# Patient Record
Sex: FEMALE | URBAN - NONMETROPOLITAN AREA
[De-identification: names, ages, dates, MRNs, and addresses within clinical notes are randomized per-mention and may not be internally consistent; named-entity substitution may affect disease eponyms.]

---

## 2021-02-18 ENCOUNTER — HISTORICAL (OUTPATIENT)
Dept: ADMINISTRATIVE | Facility: HOSPITAL | Age: 86
End: 2021-02-18

## 2021-02-19 ENCOUNTER — HISTORICAL (OUTPATIENT)
Dept: ADMINISTRATIVE | Facility: HOSPITAL | Age: 86
End: 2021-02-19

## 2021-02-21 ENCOUNTER — HISTORICAL (OUTPATIENT)
Dept: ADMINISTRATIVE | Facility: HOSPITAL | Age: 86
End: 2021-02-21

## 2021-02-22 ENCOUNTER — HISTORICAL (OUTPATIENT)
Dept: ADMINISTRATIVE | Facility: HOSPITAL | Age: 86
End: 2021-02-22

## 2021-02-25 ENCOUNTER — HISTORICAL (OUTPATIENT)
Dept: ADMINISTRATIVE | Facility: HOSPITAL | Age: 86
End: 2021-02-25

## 2021-03-02 ENCOUNTER — HISTORICAL (OUTPATIENT)
Dept: ADMINISTRATIVE | Facility: HOSPITAL | Age: 86
End: 2021-03-02

## 2021-03-26 ENCOUNTER — HISTORICAL (OUTPATIENT)
Dept: ADMINISTRATIVE | Facility: HOSPITAL | Age: 86
End: 2021-03-26

## 2025-04-10 ENCOUNTER — HOSPITAL ENCOUNTER (EMERGENCY)
Facility: HOSPITAL | Age: OVER 89
Discharge: HOME | End: 2025-04-10
Attending: STUDENT IN AN ORGANIZED HEALTH CARE EDUCATION/TRAINING PROGRAM
Payer: MEDICARE

## 2025-04-10 VITALS
HEART RATE: 66 BPM | OXYGEN SATURATION: 97 % | HEIGHT: 60 IN | DIASTOLIC BLOOD PRESSURE: 74 MMHG | TEMPERATURE: 98 F | WEIGHT: 142.69 LBS | RESPIRATION RATE: 18 BRPM | BODY MASS INDEX: 28.01 KG/M2 | SYSTOLIC BLOOD PRESSURE: 146 MMHG

## 2025-04-10 DIAGNOSIS — S01.81XA LACERATION OF FOREHEAD, INITIAL ENCOUNTER: Primary | ICD-10-CM
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
## 2025-04-10 PROCEDURE — 63600175 PHARM REV CODE 636 W HCPCS: Performed by: STUDENT IN AN ORGANIZED HEALTH CARE EDUCATION/TRAINING PROGRAM

## 2025-04-10 PROCEDURE — 99285 EMERGENCY DEPT VISIT HI MDM: CPT | Mod: 25

## 2025-04-10 PROCEDURE — 90715 TDAP VACCINE 7 YRS/> IM: CPT | Performed by: STUDENT IN AN ORGANIZED HEALTH CARE EDUCATION/TRAINING PROGRAM

## 2025-04-10 PROCEDURE — 12013 RPR F/E/E/N/L/M 2.6-5.0 CM: CPT

## 2025-04-10 PROCEDURE — 90471 IMMUNIZATION ADMIN: CPT | Performed by: STUDENT IN AN ORGANIZED HEALTH CARE EDUCATION/TRAINING PROGRAM

## 2025-04-10 PROCEDURE — 25000003 PHARM REV CODE 250

## 2025-04-10 RX ORDER — MUPIROCIN 20 MG/G
1 OINTMENT TOPICAL
Status: COMPLETED | OUTPATIENT
Start: 2025-04-10 | End: 2025-04-10

## 2025-04-10 RX ORDER — LIDOCAINE HYDROCHLORIDE 10 MG/ML
10 INJECTION, SOLUTION INFILTRATION; PERINEURAL
Status: COMPLETED | OUTPATIENT
Start: 2025-04-10 | End: 2025-04-10

## 2025-04-10 RX ADMIN — CLOSTRIDIUM TETANI TOXOID ANTIGEN (FORMALDEHYDE INACTIVATED), CORYNEBACTERIUM DIPHTHERIAE TOXOID ANTIGEN (FORMALDEHYDE INACTIVATED), BORDETELLA PERTUSSIS TOXOID ANTIGEN (GLUTARALDEHYDE INACTIVATED), BORDETELLA PERTUSSIS FILAMENTOUS HEMAGGLUTININ ANTIGEN (FORMALDEHYDE INACTIVATED), BORDETELLA PERTUSSIS PERTACTIN ANTIGEN, AND BORDETELLA PERTUSSIS FIMBRIAE 2/3 ANTIGEN 0.5 ML: 5; 2; 2.5; 5; 3; 5 INJECTION, SUSPENSION INTRAMUSCULAR at 05:04

## 2025-04-10 RX ADMIN — MUPIROCIN 1 TUBE: 20 OINTMENT TOPICAL at 05:04

## 2025-04-10 RX ADMIN — LIDOCAINE HYDROCHLORIDE 10 ML: 10 INJECTION, SOLUTION INFILTRATION; PERINEURAL at 05:04

## 2025-04-10 NOTE — ED PROVIDER NOTES
Encounter Date: 4/10/2025       History     Chief Complaint   Patient presents with    Fall    Head Laceration     PRESENTS TO ED PER [facility] FROM HOME WITH C/O FALL WITH HEAD INJURY, ON XARELTO FOR HX A-FIB. LACERATION VIA RT. EYEBROW AND LT. LATERAL HAND. BLEEDING CONTROLLED. NO LOSS OF LOC. A0X4. DENIES PAIN     HPI  Patient is a 89-year-old female past medical history of hypertension, depression, atrial fibrillation, on Eliquis, who presents to ER status post mechanical fall at home.  Patient's struck her head, causing laceration to the lateral right eyebrow.  She denies any LOC, preceding chest pain, shortness of breath.  Patient denies any pain at this time.  Review of patient's allergies indicates:  No Known Allergies  Past Medical History:   Diagnosis Date    A-fib     Depression     Hypertension     Hypothyroidism, unspecified      History reviewed. No pertinent surgical history.  No family history on file.  Social History[1]  Review of Systems   Constitutional:  Negative for fever.   HENT:  Negative for sore throat.    Respiratory:  Negative for shortness of breath.    Cardiovascular:  Negative for chest pain.   Gastrointestinal:  Negative for nausea.   Genitourinary:  Negative for dysuria.   Musculoskeletal:  Negative for back pain.   Skin:  Positive for wound. Negative for rash.   Neurological:  Negative for weakness.   Hematological:  Does not bruise/bleed easily.   All other systems reviewed and are negative.      Physical Exam     Initial Vitals [04/10/25 1702]   BP Pulse Resp Temp SpO2   (!) 146/74 71 18 97.9 °F (36.6 °C) 97 %      MAP       --         Physical Exam    Nursing note and vitals reviewed.  Constitutional: Vital signs are normal. She appears well-developed and well-nourished. She is not diaphoretic. She is active.  Non-toxic appearance. She does not appear ill. No distress.   HENT:   Head: Normocephalic.   Eyes: Conjunctivae are normal. Pupils are equal, round, and reactive to light.  Right conjunctiva is not injected. Left conjunctiva is not injected.   Neck: Trachea normal. Neck supple.   Normal range of motion.   Full passive range of motion without pain.     Cardiovascular:  Normal rate, regular rhythm, S1 normal, S2 normal, intact distal pulses and normal pulses.           Abdominal: Abdomen is soft. Bowel sounds are normal. There is no abdominal tenderness.   Musculoskeletal:      Cervical back: Full passive range of motion without pain, normal range of motion and neck supple. No rigidity.      Right lower leg: No swelling. No edema.      Left lower leg: No swelling. No edema.     Neurological: She is alert and oriented to person, place, and time.   Skin: Skin is warm and dry. Capillary refill takes less than 2 seconds.   3 cm laceration with hematoma noted to right lateral eyebrow.  Hemostatic.  No foreign body noted.         ED Course   Lac Repair    Date/Time: 4/10/2025 4:58 PM    Performed by: Jerome Meng MD  Authorized by: Jerome Meng MD    Consent:     Consent obtained:  Verbal    Consent given by:  Patient    Risks discussed:  Infection, poor cosmetic result and poor wound healing  Laceration details:     Location:  Face    Face location:  R eyebrow  Treatment:     Area cleansed with:  Povidone-iodine and saline  Skin repair:     Repair method:  Sutures    Suture size:  5-0    Wound skin closure material used: ethilon.    Suture technique:  Simple interrupted    Number of sutures:  3  Approximation:     Approximation:  Close  Repair type:     Repair type:  Simple  Post-procedure details:     Dressing:  Antibiotic ointment (4x4 gauze)    Procedure completion:  Tolerated well, no immediate complications    Labs Reviewed - No data to display       Imaging Results              CT Head Without Contrast (Final result)  Result time 04/10/25 18:04:04      Final result by Edgardo Magana MD (04/10/25 18:04:04)                   Impression:        1.  Chronic findings of atrophy and  ischemic disease.  No acute intracranial abnormality.    n/a    Category: n/a    The following dose reduction techniques are used for all CT at [facility]:    1.   Automated exposure control.    2.   Adjustment of the mA and/or kV according to patient size.    3.   Use of iterative reconstruction technique.      Electronically signed by: Edgardo Magana  Date:    04/10/2025  Time:    18:04               Narrative:    EXAMINATION:  CT HEAD WITHOUT CONTRAST    CLINICAL HISTORY:  s/p fall on eliquis;    TECHNIQUE:  Low dose axial images were obtained through the head.  Coronal and sagittal reformations were also performed. Contrast was not administered.    COMPARISON:  03/26/2021    FINDINGS:  Multiplanar imaging through the head/brain was performed.Mild generalized atrophy is present along with mild chronic ischemic disease involving the periventricular deep white matter on both sides.  I see no intraparynchymal masses, hemorhagic lesions, or dominant wedge shaped infarcts. I see no extraxial masses or abnormal fluid collections.                                       X-Ray Tibia Fibula 2 View Right (In process)                      Medications   Tdap vaccine injection 0.5 mL (0.5 mLs Intramuscular Given 4/10/25 1736)   LIDOcaine HCL 10 mg/ml (1%) injection 10 mL (10 mLs Intradermal Given by Provider 4/10/25 1736)   mupirocin 2 % ointment 1 Tube (1 Tube Topical (Top) Given 4/10/25 1730)     Medical Decision Making  Patient is a very pleasant 89-year-old female past medical history of hypertension, depression, atrial fibrillation, on Eliquis, who presents to ER status post mechanical fall at home.      Differential diagnosis includes but not limited to:  Intracranial hemorrhage, skull fracture, orbital fracture, laceration     Secondary to patient being on Eliquis, CT head obtained, negative for any acute intracranial abnormality.  Patient's laceration was repaired at the bedside.  Tolerated  without difficulty.  Patient to be going home with the daughter who was at the bedside.  Recommended follow up with her PCP in next 7-10 days for wound reassessment, suture removal.  Patient otherwise stable for discharge.  Tdap updated here in the ED. patient is not in any pain, declined any pain medications at this time.  Will discharge home with the patient's daughter.  Strict return precautions given.    Jerome Meng M.D.  Emergency Medicine        Amount and/or Complexity of Data Reviewed  Radiology: ordered.    Risk  Prescription drug management.                                      Clinical Impression:  Final diagnoses:  [W19.XXXA] Fall  [S01.81XA] Laceration of forehead, initial encounter (Primary)          ED Disposition Condition    Discharge Stable          ED Prescriptions    None       Follow-up Information       Follow up With Specialties Details Why Contact Info    Your Primary Care Provider  Schedule an appointment as soon as possible for a visit in 7 days For follow up, For suture removal, For wound re-check     [facility]  If symptoms worsen [address] [facility] [id number] [phone]               [1]   Social History  Tobacco Use    Smoking status: Never    Smokeless tobacco: Never   Substance Use Topics    Drug use: Never        eJrome Meng MD  04/10/25 1451

## 2025-04-15 ENCOUNTER — LAB REQUISITION (OUTPATIENT)
Dept: LAB | Facility: HOSPITAL | Age: OVER 89
End: 2025-04-15
Payer: MEDICARE

## 2025-04-15 DIAGNOSIS — I95.9 HYPOTENSION, UNSPECIFIED: ICD-10-CM

## 2025-04-15 DIAGNOSIS — R53.83 OTHER FATIGUE: ICD-10-CM

## 2025-04-15 LAB
ANION GAP SERPL CALC-SCNC: 8 MEQ/L (ref 2–13)
BASOPHILS # BLD AUTO: 0.06 X10(3)/MCL (ref 0.01–0.08)
BASOPHILS NFR BLD AUTO: 0.5 % (ref 0.1–1.2)
BUN SERPL-MCNC: 25 MG/DL (ref 7–20)
CALCIUM SERPL-MCNC: 9.6 MG/DL (ref 8.4–10.2)
CHLORIDE SERPL-SCNC: 104 MMOL/L (ref 98–110)
CO2 SERPL-SCNC: 25 MMOL/L (ref 21–32)
CREAT SERPL-MCNC: 1.56 MG/DL (ref 0.66–1.25)
CREAT/UREA NIT SERPL: 16 (ref 12–20)
EOSINOPHIL # BLD AUTO: 0.07 X10(3)/MCL (ref 0.04–0.36)
EOSINOPHIL NFR BLD AUTO: 0.6 % (ref 0.7–7)
ERYTHROCYTE [DISTWIDTH] IN BLOOD BY AUTOMATED COUNT: 14.6 % (ref 11–14.5)
GFR SERPLBLD CREATININE-BSD FMLA CKD-EPI: 30 ML/MIN/1.73/M2
GLUCOSE SERPL-MCNC: 87 MG/DL (ref 70–115)
HCT VFR BLD AUTO: 30.6 % (ref 36–48)
HGB BLD-MCNC: 10.2 G/DL (ref 11.8–16)
IMM GRANULOCYTES # BLD AUTO: 0.05 X10(3)/MCL (ref 0–0.03)
IMM GRANULOCYTES NFR BLD AUTO: 0.4 % (ref 0–0.5)
LYMPHOCYTES # BLD AUTO: 2.54 X10(3)/MCL (ref 1.16–3.74)
LYMPHOCYTES NFR BLD AUTO: 21.6 % (ref 20–55)
MCH RBC QN AUTO: 30.2 PG (ref 27–34)
MCHC RBC AUTO-ENTMCNC: 33.3 G/DL (ref 31–37)
MCV RBC AUTO: 90.5 FL (ref 79–99)
MONOCYTES # BLD AUTO: 1 X10(3)/MCL (ref 0.24–0.36)
MONOCYTES NFR BLD AUTO: 8.5 % (ref 4.7–12.5)
NEUTROPHILS # BLD AUTO: 8.06 X10(3)/MCL (ref 1.56–6.13)
NEUTROPHILS NFR BLD AUTO: 68.4 % (ref 37–73)
NRBC BLD AUTO-RTO: 0 %
PLATELET # BLD AUTO: 285 X10(3)/MCL (ref 140–371)
PMV BLD AUTO: 9.7 FL (ref 9.4–12.4)
POTASSIUM SERPL-SCNC: 5.1 MMOL/L (ref 3.5–5.1)
RBC # BLD AUTO: 3.38 X10(6)/MCL (ref 4–5.1)
SODIUM SERPL-SCNC: 137 MMOL/L (ref 136–145)
WBC # BLD AUTO: 11.78 X10(3)/MCL (ref 4–11.5)

## 2025-04-15 PROCEDURE — 85025 COMPLETE CBC W/AUTO DIFF WBC: CPT | Performed by: INTERNAL MEDICINE

## 2025-04-15 PROCEDURE — 80048 BASIC METABOLIC PNL TOTAL CA: CPT | Performed by: INTERNAL MEDICINE

## 2025-04-16 ENCOUNTER — HOSPITAL ENCOUNTER (EMERGENCY)
Facility: HOSPITAL | Age: OVER 89
Discharge: HOME | End: 2025-04-16
Attending: FAMILY MEDICINE
Payer: MEDICARE

## 2025-04-16 VITALS
SYSTOLIC BLOOD PRESSURE: 154 MMHG | RESPIRATION RATE: 19 BRPM | OXYGEN SATURATION: 97 % | HEART RATE: 76 BPM | DIASTOLIC BLOOD PRESSURE: 79 MMHG | TEMPERATURE: 98 F | HEIGHT: 60 IN | WEIGHT: 142.63 LBS | BODY MASS INDEX: 28 KG/M2

## 2025-04-16 DIAGNOSIS — R42 DIZZINESS: Primary | ICD-10-CM
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
## 2025-04-16 LAB
ALBUMIN SERPL-MCNC: 4.6 G/DL (ref 3.4–5)
ALBUMIN/GLOB SERPL: 1.4 RATIO
ALP SERPL-CCNC: 124 UNIT/L (ref 50–144)
ALT SERPL-CCNC: 25 UNIT/L (ref 1–45)
ANION GAP SERPL CALC-SCNC: 6 MEQ/L (ref 2–13)
AST SERPL-CCNC: 44 UNIT/L (ref 14–36)
BACTERIA #/AREA URNS AUTO: NORMAL /HPF
BASOPHILS # BLD AUTO: 0.08 X10(3)/MCL (ref 0.01–0.08)
BASOPHILS NFR BLD AUTO: 0.6 % (ref 0.1–1.2)
BILIRUB SERPL-MCNC: 0.6 MG/DL (ref 0–1)
BILIRUB UR QL STRIP.AUTO: NEGATIVE
BUN SERPL-MCNC: 24 MG/DL (ref 7–20)
CALCIUM SERPL-MCNC: 9.7 MG/DL (ref 8.4–10.2)
CHLORIDE SERPL-SCNC: 104 MMOL/L (ref 98–110)
CLARITY UR: CLEAR
CO2 SERPL-SCNC: 28 MMOL/L (ref 21–32)
COLOR UR AUTO: YELLOW
CREAT SERPL-MCNC: 1.52 MG/DL (ref 0.66–1.25)
CREAT/UREA NIT SERPL: 16 (ref 12–20)
EOSINOPHIL # BLD AUTO: 0.06 X10(3)/MCL (ref 0.04–0.36)
EOSINOPHIL NFR BLD AUTO: 0.4 % (ref 0.7–7)
ERYTHROCYTE [DISTWIDTH] IN BLOOD BY AUTOMATED COUNT: 14.6 % (ref 11–14.5)
GFR SERPLBLD CREATININE-BSD FMLA CKD-EPI: 31 ML/MIN/1.73/M2
GLOBULIN SER-MCNC: 3.4 GM/DL (ref 2–3.9)
GLUCOSE SERPL-MCNC: 120 MG/DL (ref 70–115)
GLUCOSE UR QL STRIP: NEGATIVE
HCT VFR BLD AUTO: 34.8 % (ref 36–48)
HGB BLD-MCNC: 11.1 G/DL (ref 11.8–16)
HGB UR QL STRIP: ABNORMAL
IMM GRANULOCYTES # BLD AUTO: 0.07 X10(3)/MCL (ref 0–0.03)
IMM GRANULOCYTES NFR BLD AUTO: 0.5 % (ref 0–0.5)
KETONES UR QL STRIP: NEGATIVE
LEUKOCYTE ESTERASE UR QL STRIP: NEGATIVE
LYMPHOCYTES # BLD AUTO: 3.01 X10(3)/MCL (ref 1.16–3.74)
LYMPHOCYTES NFR BLD AUTO: 21 % (ref 20–55)
MAGNESIUM SERPL-MCNC: 2.1 MG/DL (ref 1.8–2.4)
MCH RBC QN AUTO: 29.4 PG (ref 27–34)
MCHC RBC AUTO-ENTMCNC: 31.9 G/DL (ref 31–37)
MCV RBC AUTO: 92.3 FL (ref 79–99)
MONOCYTES # BLD AUTO: 1.21 X10(3)/MCL (ref 0.24–0.36)
MONOCYTES NFR BLD AUTO: 8.5 % (ref 4.7–12.5)
NEUTROPHILS # BLD AUTO: 9.87 X10(3)/MCL (ref 1.56–6.13)
NEUTROPHILS NFR BLD AUTO: 69 % (ref 37–73)
NITRITE UR QL STRIP: NEGATIVE
NRBC BLD AUTO-RTO: 0 %
PH UR STRIP: 6 [PH]
PLATELET # BLD AUTO: 323 X10(3)/MCL (ref 140–371)
PMV BLD AUTO: 8.7 FL (ref 9.4–12.4)
POTASSIUM SERPL-SCNC: 4.5 MMOL/L (ref 3.5–5.1)
PROT SERPL-MCNC: 8 GM/DL (ref 6.3–8.2)
PROT UR QL STRIP: NEGATIVE
RBC # BLD AUTO: 3.77 X10(6)/MCL (ref 4–5.1)
RBC #/AREA URNS AUTO: NORMAL /HPF
SODIUM SERPL-SCNC: 138 MMOL/L (ref 136–145)
SP GR UR STRIP.AUTO: <=1.005 (ref 1–1.03)
SQUAMOUS #/AREA URNS AUTO: NORMAL /HPF
TROPONIN I SERPL-MCNC: <0.012 NG/ML (ref 0–0.03)
UROBILINOGEN UR STRIP-ACNC: 0.2
WBC # BLD AUTO: 14.3 X10(3)/MCL (ref 4–11.5)
WBC #/AREA URNS AUTO: NORMAL /HPF

## 2025-04-16 PROCEDURE — 83735 ASSAY OF MAGNESIUM: CPT | Performed by: FAMILY MEDICINE

## 2025-04-16 PROCEDURE — 81015 MICROSCOPIC EXAM OF URINE: CPT | Performed by: FAMILY MEDICINE

## 2025-04-16 PROCEDURE — 84484 ASSAY OF TROPONIN QUANT: CPT | Performed by: FAMILY MEDICINE

## 2025-04-16 PROCEDURE — 85025 COMPLETE CBC W/AUTO DIFF WBC: CPT | Performed by: FAMILY MEDICINE

## 2025-04-16 PROCEDURE — 93010 ELECTROCARDIOGRAM REPORT: CPT | Mod: ,,, | Performed by: INTERNAL MEDICINE

## 2025-04-16 PROCEDURE — 93005 ELECTROCARDIOGRAM TRACING: CPT

## 2025-04-16 PROCEDURE — 81003 URINALYSIS AUTO W/O SCOPE: CPT | Performed by: FAMILY MEDICINE

## 2025-04-16 PROCEDURE — 99285 EMERGENCY DEPT VISIT HI MDM: CPT | Mod: 25

## 2025-04-16 PROCEDURE — 80053 COMPREHEN METABOLIC PANEL: CPT | Performed by: FAMILY MEDICINE

## 2025-04-16 RX ORDER — PANTOPRAZOLE SODIUM 40 MG/1
40 TABLET, DELAYED RELEASE ORAL DAILY
COMMUNITY
Start: 2025-02-14

## 2025-04-16 RX ORDER — RIVAROXABAN 20 MG/1
20 TABLET, FILM COATED ORAL DAILY
COMMUNITY
Start: 2025-03-03

## 2025-04-16 RX ORDER — AMITRIPTYLINE HYDROCHLORIDE 25 MG/1
25 TABLET, FILM COATED ORAL NIGHTLY
COMMUNITY
Start: 2025-01-23

## 2025-04-16 RX ORDER — LEVOTHYROXINE SODIUM 75 UG/1
75 TABLET ORAL
COMMUNITY
Start: 2025-04-07

## 2025-04-16 RX ORDER — CHOLESTYRAMINE 4 G/9G
4 POWDER, FOR SUSPENSION ORAL DAILY
COMMUNITY
Start: 2025-03-31

## 2025-04-16 RX ORDER — METOPROLOL TARTRATE 25 MG/1
12.5 TABLET, FILM COATED ORAL 2 TIMES DAILY
COMMUNITY
Start: 2025-03-17

## 2025-04-16 NOTE — DISCHARGE INSTRUCTIONS
Follow-up with primary care physician as needed.  Recommend discussion with Cardiology about need for ongoing Xarelto in light of recent trauma.  Return emergency room if symptoms worsen.

## 2025-04-17 LAB
OHS QRS DURATION: 138 MS
OHS QTC CALCULATION: 477 MS

## 2025-05-27 ENCOUNTER — HOSPITAL ENCOUNTER (OUTPATIENT)
Facility: HOSPITAL | Age: OVER 89
Discharge: HOME | End: 2025-05-28
Attending: OTOLARYNGOLOGY | Admitting: FAMILY MEDICINE
Payer: MEDICARE

## 2025-05-27 DIAGNOSIS — R79.89 ELEVATED BRAIN NATRIURETIC PEPTIDE (BNP) LEVEL: Primary | ICD-10-CM

## 2025-05-27 DIAGNOSIS — R53.1 WEAKNESS: ICD-10-CM

## 2025-05-27 DIAGNOSIS — I10 PRIMARY HYPERTENSION: ICD-10-CM

## 2025-05-27 DIAGNOSIS — R79.89 ELEVATED D-DIMER: ICD-10-CM

## 2025-05-27 DIAGNOSIS — R07.9 CHEST PAIN: ICD-10-CM

## 2025-05-27 DIAGNOSIS — E86.0 DEHYDRATION: ICD-10-CM

## 2025-05-27 DIAGNOSIS — R42 DIZZINESS: ICD-10-CM

## 2025-05-27 DIAGNOSIS — R00.2 PALPITATIONS: ICD-10-CM

## 2025-05-27 DIAGNOSIS — R00.0 TACHYCARDIA: ICD-10-CM

## 2025-05-27 DIAGNOSIS — I50.9 CONGESTIVE HEART FAILURE, UNSPECIFIED HF CHRONICITY, UNSPECIFIED HEART FAILURE TYPE: ICD-10-CM

## 2025-05-27 LAB
ALBUMIN SERPL-MCNC: 4.1 G/DL (ref 3.4–5)
ALBUMIN/GLOB SERPL: 1.2 RATIO
ALP SERPL-CCNC: 116 UNIT/L (ref 50–144)
ALT SERPL-CCNC: 18 UNIT/L (ref 1–45)
ANION GAP SERPL CALC-SCNC: 3 MEQ/L (ref 2–13)
AST SERPL-CCNC: 35 UNIT/L (ref 14–36)
BACTERIA #/AREA URNS AUTO: NORMAL /HPF
BASOPHILS # BLD AUTO: 0.05 X10(3)/MCL (ref 0.01–0.08)
BASOPHILS NFR BLD AUTO: 0.6 % (ref 0.1–1.2)
BILIRUB SERPL-MCNC: 0.6 MG/DL (ref 0–1)
BILIRUB UR QL STRIP.AUTO: NEGATIVE
BNP BLD-MCNC: 1540 PG/ML (ref 0–124.9)
BUN SERPL-MCNC: 19 MG/DL (ref 7–20)
CALCIUM SERPL-MCNC: 9.9 MG/DL (ref 8.4–10.2)
CHLORIDE SERPL-SCNC: 110 MMOL/L (ref 98–110)
CLARITY UR: CLEAR
CO2 SERPL-SCNC: 28 MMOL/L (ref 21–32)
COLOR UR AUTO: YELLOW
CREAT SERPL-MCNC: 0.96 MG/DL (ref 0.66–1.25)
CREAT/UREA NIT SERPL: 20 (ref 12–20)
D DIMER PPP IA.FEU-MCNC: 1.28 MG/L FEU (ref 0.19–0.5)
EOSINOPHIL # BLD AUTO: 0.06 X10(3)/MCL (ref 0.04–0.36)
EOSINOPHIL NFR BLD AUTO: 0.7 % (ref 0.7–7)
ERYTHROCYTE [DISTWIDTH] IN BLOOD BY AUTOMATED COUNT: 13.7 % (ref 11–14.5)
FLUAV AG UPPER RESP QL IA.RAPID: NOT DETECTED
FLUBV AG UPPER RESP QL IA.RAPID: NOT DETECTED
GFR SERPLBLD CREATININE-BSD FMLA CKD-EPI: 54 ML/MIN/1.73/M2
GLOBULIN SER-MCNC: 3.3 GM/DL (ref 2–3.9)
GLUCOSE SERPL-MCNC: 103 MG/DL (ref 70–115)
GLUCOSE UR QL STRIP: NEGATIVE
HCT VFR BLD AUTO: 40 % (ref 36–48)
HGB BLD-MCNC: 13.1 G/DL (ref 11.8–16)
HGB UR QL STRIP: ABNORMAL
IMM GRANULOCYTES # BLD AUTO: 0.01 X10(3)/MCL (ref 0–0.03)
IMM GRANULOCYTES NFR BLD AUTO: 0.1 % (ref 0–0.5)
KETONES UR QL STRIP: NEGATIVE
LACTATE SERPL-SCNC: 1.3 MMOL/L (ref 0.4–2)
LEUKOCYTE ESTERASE UR QL STRIP: NEGATIVE
LYMPHOCYTES # BLD AUTO: 2.33 X10(3)/MCL (ref 1.16–3.74)
LYMPHOCYTES NFR BLD AUTO: 28.7 % (ref 20–55)
MAGNESIUM SERPL-MCNC: 2 MG/DL (ref 1.8–2.4)
MCH RBC QN AUTO: 29.8 PG (ref 27–34)
MCHC RBC AUTO-ENTMCNC: 32.8 G/DL (ref 31–37)
MCV RBC AUTO: 90.9 FL (ref 79–99)
MONOCYTES # BLD AUTO: 0.77 X10(3)/MCL (ref 0.24–0.36)
MONOCYTES NFR BLD AUTO: 9.5 % (ref 4.7–12.5)
NEUTROPHILS # BLD AUTO: 4.91 X10(3)/MCL (ref 1.56–6.13)
NEUTROPHILS NFR BLD AUTO: 60.4 % (ref 37–73)
NITRITE UR QL STRIP: NEGATIVE
NRBC BLD AUTO-RTO: 0 %
PH UR STRIP: 7 [PH]
PLATELET # BLD AUTO: 297 X10(3)/MCL (ref 140–371)
PMV BLD AUTO: 9.4 FL (ref 9.4–12.4)
POTASSIUM SERPL-SCNC: 4.1 MMOL/L (ref 3.5–5.1)
PROT SERPL-MCNC: 7.4 GM/DL (ref 6.3–8.2)
PROT UR QL STRIP: NEGATIVE
RBC # BLD AUTO: 4.4 X10(6)/MCL (ref 4–5.1)
RBC #/AREA URNS AUTO: NORMAL /HPF
RSV A 5' UTR RNA NPH QL NAA+PROBE: NEGATIVE
SARS-COV-2 RNA RESP QL NAA+PROBE: NEGATIVE
SODIUM SERPL-SCNC: 141 MMOL/L (ref 136–145)
SP GR UR STRIP.AUTO: 1.01 (ref 1–1.03)
SQUAMOUS #/AREA URNS AUTO: NORMAL /HPF
TROPONIN I SERPL-MCNC: <0.012 NG/ML (ref 0–0.03)
TROPONIN I SERPL-MCNC: <0.012 NG/ML (ref 0–0.03)
TSH SERPL-ACNC: 1.07 UIU/ML (ref 0.36–3.74)
UROBILINOGEN UR STRIP-ACNC: 0.2
WBC # BLD AUTO: 8.13 X10(3)/MCL (ref 4–11.5)
WBC #/AREA URNS AUTO: NORMAL /HPF

## 2025-05-27 PROCEDURE — G0378 HOSPITAL OBSERVATION PER HR: HCPCS

## 2025-05-27 PROCEDURE — 80053 COMPREHEN METABOLIC PANEL: CPT | Performed by: OTOLARYNGOLOGY

## 2025-05-27 PROCEDURE — 85379 FIBRIN DEGRADATION QUANT: CPT | Performed by: OTOLARYNGOLOGY

## 2025-05-27 PROCEDURE — 25500020 PHARM REV CODE 255: Performed by: OTOLARYNGOLOGY

## 2025-05-27 PROCEDURE — 81003 URINALYSIS AUTO W/O SCOPE: CPT | Performed by: OTOLARYNGOLOGY

## 2025-05-27 PROCEDURE — 84484 ASSAY OF TROPONIN QUANT: CPT | Performed by: OTOLARYNGOLOGY

## 2025-05-27 PROCEDURE — 83735 ASSAY OF MAGNESIUM: CPT | Performed by: OTOLARYNGOLOGY

## 2025-05-27 PROCEDURE — 94761 N-INVAS EAR/PLS OXIMETRY MLT: CPT

## 2025-05-27 PROCEDURE — 25000003 PHARM REV CODE 250: Performed by: OTOLARYNGOLOGY

## 2025-05-27 PROCEDURE — 83880 ASSAY OF NATRIURETIC PEPTIDE: CPT | Performed by: OTOLARYNGOLOGY

## 2025-05-27 PROCEDURE — 0241U COVID/RSV/FLU A&B PCR: CPT | Performed by: OTOLARYNGOLOGY

## 2025-05-27 PROCEDURE — 93005 ELECTROCARDIOGRAM TRACING: CPT

## 2025-05-27 PROCEDURE — 81015 MICROSCOPIC EXAM OF URINE: CPT | Performed by: OTOLARYNGOLOGY

## 2025-05-27 PROCEDURE — 85025 COMPLETE CBC W/AUTO DIFF WBC: CPT | Performed by: OTOLARYNGOLOGY

## 2025-05-27 PROCEDURE — 25000003 PHARM REV CODE 250: Performed by: FAMILY MEDICINE

## 2025-05-27 PROCEDURE — 83605 ASSAY OF LACTIC ACID: CPT | Performed by: OTOLARYNGOLOGY

## 2025-05-27 PROCEDURE — 93010 ELECTROCARDIOGRAM REPORT: CPT | Mod: ,,, | Performed by: INTERNAL MEDICINE

## 2025-05-27 PROCEDURE — 97161 PT EVAL LOW COMPLEX 20 MIN: CPT

## 2025-05-27 PROCEDURE — 84443 ASSAY THYROID STIM HORMONE: CPT | Performed by: OTOLARYNGOLOGY

## 2025-05-27 PROCEDURE — 63600175 PHARM REV CODE 636 W HCPCS: Performed by: OTOLARYNGOLOGY

## 2025-05-27 PROCEDURE — 96372 THER/PROPH/DIAG INJ SC/IM: CPT | Performed by: OTOLARYNGOLOGY

## 2025-05-27 RX ORDER — ONDANSETRON HYDROCHLORIDE 2 MG/ML
4 INJECTION, SOLUTION INTRAVENOUS EVERY 6 HOURS PRN
Status: DISCONTINUED | OUTPATIENT
Start: 2025-05-27 | End: 2025-05-27

## 2025-05-27 RX ORDER — LEVOTHYROXINE SODIUM 75 UG/1
75 TABLET ORAL
Status: DISCONTINUED | OUTPATIENT
Start: 2025-05-28 | End: 2025-05-28 | Stop reason: HOSPADM

## 2025-05-27 RX ORDER — METOPROLOL TARTRATE 1 MG/ML
5 INJECTION, SOLUTION INTRAVENOUS
Status: COMPLETED | OUTPATIENT
Start: 2025-05-27 | End: 2025-05-27

## 2025-05-27 RX ORDER — ACETAMINOPHEN 325 MG/1
650 TABLET ORAL EVERY 8 HOURS PRN
Status: DISCONTINUED | OUTPATIENT
Start: 2025-05-27 | End: 2025-05-27

## 2025-05-27 RX ORDER — ENOXAPARIN SODIUM 100 MG/ML
40 INJECTION SUBCUTANEOUS
Status: COMPLETED | OUTPATIENT
Start: 2025-05-27 | End: 2025-05-27

## 2025-05-27 RX ORDER — ENOXAPARIN SODIUM 100 MG/ML
40 INJECTION SUBCUTANEOUS EVERY 24 HOURS
Status: DISCONTINUED | OUTPATIENT
Start: 2025-05-27 | End: 2025-05-27

## 2025-05-27 RX ORDER — MULTIVITAMIN
1 TABLET ORAL DAILY
COMMUNITY

## 2025-05-27 RX ORDER — ONDANSETRON HYDROCHLORIDE 2 MG/ML
4 INJECTION, SOLUTION INTRAVENOUS EVERY 6 HOURS PRN
Status: DISCONTINUED | OUTPATIENT
Start: 2025-05-27 | End: 2025-05-28 | Stop reason: HOSPADM

## 2025-05-27 RX ORDER — PANTOPRAZOLE SODIUM 40 MG/1
40 TABLET, DELAYED RELEASE ORAL DAILY
Status: DISCONTINUED | OUTPATIENT
Start: 2025-05-27 | End: 2025-05-28 | Stop reason: HOSPADM

## 2025-05-27 RX ORDER — PSYLLIUM HUSK 0.4 G
600 CAPSULE ORAL DAILY
COMMUNITY

## 2025-05-27 RX ORDER — CHOLESTYRAMINE 4 G/9G
1 POWDER, FOR SUSPENSION ORAL DAILY
Status: DISCONTINUED | OUTPATIENT
Start: 2025-05-27 | End: 2025-05-28 | Stop reason: HOSPADM

## 2025-05-27 RX ORDER — ASPIRIN 325 MG
325 TABLET, DELAYED RELEASE (ENTERIC COATED) ORAL DAILY
COMMUNITY

## 2025-05-27 RX ORDER — MICONAZOLE NITRATE 2 G/100G
POWDER TOPICAL 2 TIMES DAILY
Status: DISCONTINUED | OUTPATIENT
Start: 2025-05-27 | End: 2025-05-28 | Stop reason: HOSPADM

## 2025-05-27 RX ORDER — AMITRIPTYLINE HYDROCHLORIDE 25 MG/1
25 TABLET, FILM COATED ORAL NIGHTLY
Status: DISCONTINUED | OUTPATIENT
Start: 2025-05-27 | End: 2025-05-27

## 2025-05-27 RX ORDER — TALC
6 POWDER (GRAM) TOPICAL NIGHTLY PRN
Status: DISCONTINUED | OUTPATIENT
Start: 2025-05-27 | End: 2025-05-28 | Stop reason: HOSPADM

## 2025-05-27 RX ORDER — SODIUM CHLORIDE 0.9 % (FLUSH) 0.9 %
3 SYRINGE (ML) INJECTION EVERY 6 HOURS PRN
Status: DISCONTINUED | OUTPATIENT
Start: 2025-05-27 | End: 2025-05-28 | Stop reason: HOSPADM

## 2025-05-27 RX ORDER — ACETAMINOPHEN 325 MG/1
650 TABLET ORAL EVERY 4 HOURS PRN
Status: DISCONTINUED | OUTPATIENT
Start: 2025-05-27 | End: 2025-05-28 | Stop reason: HOSPADM

## 2025-05-27 RX ORDER — AMOXICILLIN 250 MG
1 CAPSULE ORAL 2 TIMES DAILY
Status: DISCONTINUED | OUTPATIENT
Start: 2025-05-27 | End: 2025-05-28 | Stop reason: HOSPADM

## 2025-05-27 RX ORDER — ENOXAPARIN SODIUM 100 MG/ML
30 INJECTION SUBCUTANEOUS EVERY 24 HOURS
Status: DISCONTINUED | OUTPATIENT
Start: 2025-05-28 | End: 2025-05-28 | Stop reason: HOSPADM

## 2025-05-27 RX ORDER — METOPROLOL TARTRATE 25 MG/1
12.5 TABLET ORAL 2 TIMES DAILY
Status: DISCONTINUED | OUTPATIENT
Start: 2025-05-27 | End: 2025-05-28 | Stop reason: HOSPADM

## 2025-05-27 RX ORDER — TALC
6 POWDER (GRAM) TOPICAL NIGHTLY PRN
Status: DISCONTINUED | OUTPATIENT
Start: 2025-05-27 | End: 2025-05-27

## 2025-05-27 RX ORDER — SODIUM CHLORIDE 0.9 % (FLUSH) 0.9 %
2 SYRINGE (ML) INJECTION EVERY 6 HOURS PRN
Status: DISCONTINUED | OUTPATIENT
Start: 2025-05-27 | End: 2025-05-28 | Stop reason: HOSPADM

## 2025-05-27 RX ORDER — ENOXAPARIN SODIUM 100 MG/ML
40 INJECTION SUBCUTANEOUS EVERY 24 HOURS
Status: DISCONTINUED | OUTPATIENT
Start: 2025-05-28 | End: 2025-05-27

## 2025-05-27 RX ADMIN — MICONAZOLE NITRATE 2 % TOPICAL POWDER: at 02:05

## 2025-05-27 RX ADMIN — MICONAZOLE NITRATE 2 % TOPICAL POWDER: at 08:05

## 2025-05-27 RX ADMIN — ENOXAPARIN SODIUM 40 MG: 40 INJECTION SUBCUTANEOUS at 08:05

## 2025-05-27 RX ADMIN — IOHEXOL 57 ML: 350 INJECTION, SOLUTION INTRAVENOUS at 09:05

## 2025-05-27 RX ADMIN — SENNOSIDES AND DOCUSATE SODIUM 1 TABLET: 8.6; 5 TABLET ORAL at 08:05

## 2025-05-27 RX ADMIN — METOPROLOL TARTRATE 12.5 MG: 25 TABLET, FILM COATED ORAL at 08:05

## 2025-05-27 RX ADMIN — METOPROLOL TARTRATE 5 MG: 1 INJECTION, SOLUTION INTRAVENOUS at 07:05

## 2025-05-27 RX ADMIN — METOPROLOL TARTRATE 5 MG: 1 INJECTION, SOLUTION INTRAVENOUS at 08:05

## 2025-05-27 RX ADMIN — METOPROLOL TARTRATE 12.5 MG: 25 TABLET, FILM COATED ORAL at 01:05

## 2025-05-27 RX ADMIN — SODIUM CHLORIDE 500 ML: 9 INJECTION, SOLUTION INTRAVENOUS at 09:05

## 2025-05-27 NOTE — PROGRESS NOTES
Pharmacist Renal Dose Adjustment Note    Shoaib Campbell is a 89 y.o. female being treated with the medication enoxaparin    Patient Data:    Vital Signs (Most Recent):  Temp: 97.7 °F (36.5 °C) (05/27/25 1115)  Pulse: 73 (05/27/25 1115)  Resp: 20 (05/27/25 1115)  BP: (!) 155/91 (05/27/25 1115)  SpO2: 98 % (05/27/25 1115) Vital Signs (72h Range):  Temp:  [97.7 °F (36.5 °C)-98.1 °F (36.7 °C)]   Pulse:  []   Resp:  [14-20]   BP: (107-155)/()   SpO2:  [95 %-98 %]      Recent Labs   Lab 05/27/25  0737   CREATININE 0.96     Serum creatinine: 0.96 mg/dL 05/27/25 0737  Estimated creatinine clearance: 27.5 mL/min    Medication:enoxaparin dose: 40mg frequency q24h will be changed to medication:enoxaparin dose:30mg frequency:q24h    Pharmacist's Name: Yodit Lowry  Pharmacist's Extension: [phone]

## 2025-05-27 NOTE — PLAN OF CARE
05/27/25 1301   Discharge Assessment   Assessment Type Discharge Planning Assessment   Confirmed/corrected address, phone number and insurance Yes   Confirmed Demographics Correct on Facesheet   Source of Information patient   Communicated JUAN with patient/caregiver Yes   People in Home alone   Facility Arrived From: home   Do you expect to return to your current living situation? Yes   Do you have help at home or someone to help you manage your care at home? Yes   Who are your caregiver(s) and their phone number(s)? daughter lives in same yard  Rita [phone]   Prior to hospitilization cognitive status: Alert/Oriented   Current cognitive status: Alert/Oriented   Walking or Climbing Stairs Difficulty yes   Walking or Climbing Stairs transferring difficulty, requires equipment;transferring difficulty, assistance 1 person   Mobility Management walker and assistance of another   Dressing/Bathing Difficulty no   Home Accessibility not wheelchair accessible   Equipment Currently Used at Home cane, straight;walker, rolling;wheelchair   Readmission within 30 days? No   Patient currently being followed by outpatient case management? No   Do you currently have service(s) that help you manage your care at home? Yes   Name and Contact number of agency [email or web address]   Is the pt/caregiver preference to resume services with current agency Yes   Do you take prescription medications? Yes   Do you have prescription coverage? Yes   Do you have any problems affording any of your prescribed medications? No   Is the patient taking medications as prescribed? yes   Who is going to help you get home at discharge? bharti Salinas   How do you get to doctors appointments? family or friend will provide   Are you on dialysis? No   Do you take coumadin? No   Discharge Plan A Home Health   Discharge Plan B Home Health   DME Needed Upon Discharge  none   Discharge Plan discussed with: Patient   Transition of Care Barriers Mobility   Physical  Activity   On average, how many days per week do you engage in moderate to strenuous exercise (like a brisk walk)? 0 days   Financial Resource Strain   How hard is it for you to pay for the very basics like food, housing, medical care, and heating? Not hard   Housing Stability   In the last 12 months, was there a time when you were not able to pay the mortgage or rent on time? N   In the past 12 months, how many times have you moved where you were living? 0   At any time in the past 12 months, were you homeless or living in a shelter (including now)? N   Transportation Needs   In the past 12 months, has lack of transportation kept you from medical appointments or from getting medications? no   In the past 12 months, has lack of transportation kept you from meetings, work, or from getting things needed for daily living? No   Food Insecurity   Within the past 12 months, you worried that your food would run out before you got the money to buy more. Never true   Within the past 12 months, the food you bought just didn't last and you didn't have money to get more. Never true   Stress   Do you feel stress - tense, restless, nervous, or anxious, or unable to sleep at night because your mind is troubled all the time - these days? Not at all   Social Isolation   How often do you feel lonely or isolated from those around you?  Never   Alcohol Use   Q1: How often do you have a drink containing alcohol? Never   Utilities   In the past 12 months has the electric, gas, oil, or water company threatened to shut off services in your home? No   Health Literacy   How often do you need to have someone help you when you read instructions, pamphlets, or other written material from your doctor or pharmacy? Never

## 2025-05-27 NOTE — PLAN OF CARE
Problem: Physical Therapy  Goal: Physical Therapy Goal  Description: Goals to be met by: discharge     Patient will increase functional independence with mobility by performin. Improve wound environment to promote wound healing    Outcome: Met

## 2025-05-27 NOTE — H&P
Hospital Medicine  History & Physical Examination       Patient: Shoaib Campbell  MRN: [id number]  STATUS: OP- Observation   DOS: 5/27/2025   PCP: No primary care provider on file.      CC: tachycardia        HISTORY OF PRESENT ILLNESS   89 year old female, unknown to [facility], with PMH of PA recently taken off OACF, depression, HTN, hypothyriodism, who presented to the ER with dizziness/lightheadedness. She reported she woke up this am feeling this way and had no issues when going to sleep the night prior. ER workup revealed elevated d-dimer , CTA was negative for PE. EKG revealed Sinus tachycardia. She was given IV lopressor with no response, but did respond well IVF bolus.       REVIEW OF SYSTEMS     Except as documented, all other systems reviewed and negative       PAST MEDICAL HISTORY     Past Medical History:   Diagnosis Date    A-fib     Depression     Hypertension     Hypothyroidism, unspecified           PAST SURGICAL HISTORY     History reviewed. No pertinent surgical history.       FAMILY HISTORY     Reviewed, negative in relation to patient's current condition.      SOCIAL HISTORY     Denies alcohol, tobacco or drug abuse    Social History     Tobacco Use    Smoking status: Never     Passive exposure: Never    Smokeless tobacco: Never   Substance Use Topics    Alcohol use: Never          ALLERGIES     Patient has no known allergies.      HOME MEDICATIONS     Current Outpatient Medications   Medication Instructions    aspirin (ECOTRIN) 325 mg, Daily    cholestyramine (QUESTRAN) 4 gram packet 4 g, Daily    levothyroxine (SYNTHROID) 75 mcg, Before breakfast    pantoprazole (PROTONIX) 40 mg, Daily          PHYSICAL EXAM   VITALS: T 97.7 °F (36.5 °C)   BP (!) 155/91   P 73   RR 20   O2 98 %    GENERAL: Awake and in NAD  HEENT: NC/AT, EOMI, PERRL.  NECK: Supple,  No JVD  LUNGS: CTA B/L  CVS: RRR, S1S2 positive  GI/: Soft, NT/ND, bowel sounds positive.  EXTREMITIES: Peripheral pulses 2+, no peripheral edema  DERM:  "Warm, dry.  No rashes or lesions noted.  NEURO: AAOx3, no focal neurologic deficit  PSYCH: Cooperative, appropriate mood and affect       DIAGNOSTICS     Recent Labs     05/27/25  0737   WBC 8.13   RBC 4.40   HGB 13.1   HCT 40.0   MCV 90.9   MCH 29.8   MCHC 32.8   RDW 13.7        No results for input(s): "LACTIC" in the last 72 hours.  No results for input(s): "INR", "APTT", "D-DIMER" in the last 72 hours.  No results for input(s): "HGBA1C", "CHOL", "TRIG", "LDL", "VLDL", "HDL" in the last 72 hours.   Recent Labs     05/27/25  0737 05/27/25  0830     --    K 4.1  --    CO2 28  --    BUN 19  --    CREATININE 0.96  --    CALCIUM 9.9  --    MG 2.00  --    ALBUMIN 4.1  --    GLOBULIN 3.3  --    ALKPHOS 116  --    ALT 18  --    AST 35  --    BILITOT 0.6  --    TSH  --  1.070     Recent Labs     05/27/25  0737 05/27/25  0830   TROPONINI <0.012 <0.012          CTA Chest Non-Coronary (PE Studies)  Narrative: EXAMINATION:  CTA CHEST NON CORONARY (PE STUDIES)    CLINICAL HISTORY:  Pulmonary embolism (PE) suspected, positive D-dimer;    TECHNIQUE:  Axial CT images were obtained through the chest after IV administration of 80 cc Omnipaque 350 contrast.  MIP, coronal and sagittal reconstructions submitted and interpreted. Automated exposure control utilized. DLP: 127 mGy-cm    COMPARISON:  None    FINDINGS:  The visualized thyroid is unremarkable.    Pulmonary artery is normal in diameter. No filling defects are in the main pulmonary artery or segmental branches.    Heart is normal size.  Thoracic aorta is normal in caliber.    Central airways are clear.    No focal consolidation, pneumothorax or pleural effusion.  Mild dependent lung changes are present.  Left lower lobe calcified granuloma.    No pathologically enlarged mediastinal or axillary lymph nodes.    No acute osseous findings.    Visualized portions of the upper abdomen show no acute findings.  Impression: No evidence of pulmonary artery thromboembolic " disease.    Electronically signed by: Joaquim Damon  Date:    05/27/2025  Time:    09:28  X-Ray Chest AP Portable  Narrative: EXAMINATION:  XR CHEST AP PORTABLE    CLINICAL HISTORY:  Weakness    COMPARISON:  None    FINDINGS:  Single AP chest radiograph is provided for evaluation.    Cardiac silhouette is normal in size.    No focal consolidation, pneumothorax, or pleural effusion.    No acute osseous findings.  Impression: No radiographic evidence of an acute cardiopulmonary process.    Electronically signed by: Joaquim Damon  Date:    05/27/2025  Time:    07:32        ASSESSMENT   SVT, concerted to NSR  Hypotension   Weakness  Chronic systolic CHF    PLAN     Cardio follow  Consider start low dose BB/digoxin  Telemetry     Prophylaxis: lovenox   Code Status: DNR      Patient Screened for food insecurity, housing instability, transportation needs, utility difficulties, and interpersonal safety.  No needs identified      Ranjeet Eckert MD  [facility]

## 2025-05-27 NOTE — ED PROVIDER NOTES
Encounter Date: 5/27/2025       History     Chief Complaint   Patient presents with    Weakness    Hypotension    Dizziness     Pt presented to ED per EMS with c/o generalized weakness, lightheadedness and hypotension. Pt reports hx of AFIB and was taken off of her blood thinners a couple of months ago.      THE PATIENT IS A 89 YEAR OLD SPRY  FEMALE WHO AWOKE THIS AM WITH LIGHTHEADEDNESS AND DIZZINESS, WORSE WITH LIFTING HER HEAD OFF OF THE PILLOW AND WITH STANDING. SHE DENIES CHEST PAIN, SOB, NAUSEA, EMESIS, DIARRHEA, DYSURIA OR ABDOMINAL PAIN. NO FEVER OR COUGH. NO TRUE VERTIGO. SHE HAS A HISTORY OF ATRIAL FIBRILLATION AND WAS CONVERTED WITH MEDICATION OVER FOUR YEARS AGO. SHE IS TACHYCARDIC IN 'S ON ARRIVAL WITH EMS. THE DAUGHTER IS PRESENT IN THE ROOM.        Review of patient's allergies indicates:  No Known Allergies  Past Medical History:   Diagnosis Date    A-fib     Depression     Hypertension     Hypothyroidism, unspecified      History reviewed. No pertinent surgical history.  No family history on file.  Social History[1]  Review of Systems   Constitutional:  Positive for activity change.   Respiratory:  Negative for cough, chest tightness and shortness of breath.    Cardiovascular:  Negative for chest pain, palpitations and leg swelling.   Neurological:  Positive for dizziness, weakness and light-headedness.   All other systems reviewed and are negative.      Physical Exam     Initial Vitals [05/27/25 0720]   BP Pulse Resp Temp SpO2   (!) 127/91 (!) 139 18 97.8 °F (36.6 °C) 96 %      MAP       --         Physical Exam    Nursing note and vitals reviewed.  Constitutional: She appears well-developed and well-nourished. No distress.   HENT:   Head: Normocephalic and atraumatic.   Eyes: Conjunctivae and EOM are normal. Pupils are equal, round, and reactive to light.   Neck: Neck supple.   Normal range of motion.  Cardiovascular:  Regular rhythm and normal heart sounds.           TACHYCARDIC  130'S   Pulmonary/Chest: Breath sounds normal.   Abdominal: Abdomen is soft. Bowel sounds are normal.   Musculoskeletal:         General: Normal range of motion.      Cervical back: Normal range of motion and neck supple.     Neurological: She is alert and oriented to person, place, and time. She has normal strength. GCS score is 15. GCS eye subscore is 4. GCS verbal subscore is 5. GCS motor subscore is 6.   Skin: Skin is warm and dry.   Psychiatric: She has a normal mood and affect. Her behavior is normal. Judgment and thought content normal.         ED Course   Procedures  Labs Reviewed   NT-PRO NATRIURETIC PEPTIDE - Abnormal       Result Value    ProBNP 1,540.0 (*)    URINALYSIS, REFLEX TO URINE CULTURE - Abnormal    Color, UA Yellow      Appearance, UA Clear      Specific Gravity, UA 1.010      pH, UA 7.0      Protein, UA Negative      Glucose, UA Negative      Ketones, UA Negative      Blood, UA Small (*)     Bilirubin, UA Negative      Urobilinogen, UA 0.2      Nitrites, UA Negative      Leukocyte Esterase, UA Negative      Narrative:      URINE STABILITY IS 2 HOURS AT ROOM TEMP OR    SIX HOURS REFRIGERATED. PERFORMING TESTING ON    SPECIMENS GREATER THAN THIS AGE MAY AFFECT THE    FOLLOWING TESTS:    PH          SPECIFIC GRAVITY           BLOOD    CLARITY     BILIRUBIN               UROBILINOGEN   CBC WITH DIFFERENTIAL - Abnormal    WBC 8.13      RBC 4.40      Hgb 13.1      Hct 40.0      MCV 90.9      MCH 29.8      MCHC 32.8      RDW 13.7      Platelet 297      MPV 9.4      Neut % 60.4      Lymph % 28.7      Mono % 9.5      Eos % 0.7      Basophil % 0.6      Lymph # 2.33      Neut # 4.91      Mono # 0.77 (*)     Eos # 0.06      Baso # 0.05      Imm Gran # 0.01      Imm Grans % 0.1      NRBC% 0.0     D DIMER, QUANTITATIVE - Abnormal    D-Dimer 1.28 (*)    TROPONIN I - Normal    Troponin-I <0.012     MAGNESIUM - Normal    Magnesium Level 2.00     LACTIC ACID, PLASMA - Normal    Lactic Acid Level 1.3      COVID/RSV/FLU A&B PCR - Normal    Influenza A PCR Not Detected      Influenza B PCR Not Detected      Respiratory Syncytial Virus PCR Negative      SARS-CoV-2 PCR Negative      Narrative:     The Xpert Xpress SARS-CoV-2/FLU/RSV plus is a rapid, multiplexed real-time PCR test intended for the simultaneous qualitative detection and differentiation of SARS-CoV-2, Influenza A, Influenza B, and respiratory syncytial virus (RSV) viral RNA in either nasopharyngeal swab or nasal swab specimens.         TROPONIN I - Normal    Troponin-I <0.012     URINALYSIS, MICROSCOPIC - Normal    Bacteria, UA None Seen      RBC, UA 0-2      WBC, UA None Seen      Squamous Epithelial Cells, UA Rare     CBC W/ AUTO DIFFERENTIAL    Narrative:     The following orders were created for panel order CBC auto differential.  Procedure                               Abnormality         Status                     ---------                               -----------         ------                     CBC with Differential[[id number]]       Abnormal            Final result                 Please view results for these tests on the individual orders.   COMPREHENSIVE METABOLIC PANEL    Sodium 141      Potassium 4.1      Chloride 110      CO2 28      Glucose 103      Blood Urea Nitrogen 19      Creatinine 0.96      Calcium 9.9      Protein Total 7.4      Albumin 4.1      Globulin 3.3      Albumin/Globulin Ratio 1.2      Bilirubin Total 0.6            ALT 18      AST 35      eGFR 54      Anion Gap 3.0      BUN/Creatinine Ratio 20     TSH     EKG Readings: (Independently Interpreted)   Initial Reading: No STEMI. Rhythm: Sinus Tachycardia. Ectopy: No Ectopy. Conduction: Normal. ST Segments: Normal ST Segments. T Waves: Normal. Axis: Normal. Clinical Impression: Sinus Tachycardia and Atrial Fibrillation with RVR with LBBB       Imaging Results              CTA Chest Non-Coronary (PE Studies) (Final result)  Result time 05/27/25 09:28:15      Final  result by Joaquim Damon MD (05/27/25 09:28:15)                   Impression:      No evidence of pulmonary artery thromboembolic disease.      Electronically signed by: Joaquim Damon  Date:    05/27/2025  Time:    09:28               Narrative:    EXAMINATION:  CTA CHEST NON CORONARY (PE STUDIES)    CLINICAL HISTORY:  Pulmonary embolism (PE) suspected, positive D-dimer;    TECHNIQUE:  Axial CT images were obtained through the chest after IV administration of 80 cc Omnipaque 350 contrast.  MIP, coronal and sagittal reconstructions submitted and interpreted. Automated exposure control utilized. DLP: 127 mGy-cm    COMPARISON:  None    FINDINGS:  The visualized thyroid is unremarkable.    Pulmonary artery is normal in diameter. No filling defects are in the main pulmonary artery or segmental branches.    Heart is normal size.  Thoracic aorta is normal in caliber.    Central airways are clear.    No focal consolidation, pneumothorax or pleural effusion.  Mild dependent lung changes are present.  Left lower lobe calcified granuloma.    No pathologically enlarged mediastinal or axillary lymph nodes.    No acute osseous findings.    Visualized portions of the upper abdomen show no acute findings.                                       X-Ray Chest AP Portable (Final result)  Result time 05/27/25 07:32:19      Final result by Joaquim Damon MD (05/27/25 07:32:19)                   Impression:      No radiographic evidence of an acute cardiopulmonary process.      Electronically signed by: Joaquim Damon  Date:    05/27/2025  Time:    07:32               Narrative:    EXAMINATION:  XR CHEST AP PORTABLE    CLINICAL HISTORY:  Weakness    COMPARISON:  None    FINDINGS:  Single AP chest radiograph is provided for evaluation.    Cardiac silhouette is normal in size.    No focal consolidation, pneumothorax, or pleural effusion.    No acute osseous findings.                                       Medications   sodium chloride  0.9% bolus 500 mL 500 mL (0 mLs Intravenous Stopped 5/27/25 1031)   metoprolol injection 5 mg (5 mg Intravenous Given 5/27/25 0742)   metoprolol injection 5 mg (5 mg Intravenous Given 5/27/25 0813)   enoxaparin injection 40 mg (40 mg Subcutaneous Given 5/27/25 0825)   iohexoL (OMNIPAQUE 350) injection 80 mL (57 mLs Intravenous Given 5/27/25 0908)     Medical Decision Making  TACHYCARDIA SINUS WITH DIZZINESS AND WEAKNESS. RULE OUT CARDIAC ETIOLOGY VS DEHYDRATION VS OTHER. WORKUP IN PROGRESS. WILL RECOMMEND OBSERVATION ADMISSION.    CARDIOLOGY CONSULT COMPLETED. CARDIOLOGIST FEELS THIS IS SINUS TACH AND NOT NEW ONSET ATRIAL FIB. WE WILL TRY 500CC FLUID BOLUS. EF IS LOW AND SHE HAS CHF SO WE HAVE TO BE CAREFUL. HE IS GOING TO CHECK ON THE CARDIOLOGY AVAILABILITY FOR HER TO STAY HERE. CTA COMPLETED AND WAITING RESULTS.    NO CARDIOLOGY AVAILABLE FOR A FEW DAYS SO HE RECOMMENDED TRANSFERRING THE PATIENT.  CTA NEGATIVE.    Amount and/or Complexity of Data Reviewed  Labs: ordered.  Radiology: ordered.    Risk  Prescription drug management.                                      Clinical Impression:  Final diagnoses:  [R07.9] Chest pain  [R53.1] Weakness  [R42] Dizziness  [R79.89] Elevated brain natriuretic peptide (BNP) level (Primary)  [R79.89] Elevated d-dimer  [I50.9] Congestive heart failure, unspecified HF chronicity, unspecified heart failure type  [R00.0] Tachycardia                       [1]   Social History  Tobacco Use    Smoking status: Never     Passive exposure: Never    Smokeless tobacco: Never   Substance Use Topics    Alcohol use: Never    Drug use: Never        Sakina Santiago MD  05/27/25 [id number]

## 2025-05-27 NOTE — PT/OT/SLP EVAL
Physical Therapy Evaluation and Discharge Note    Patient Name:  Shoaib Campbell   MRN:  [id number]    Recommendations:     Discharge Recommendations: No Therapy Indicated  Discharge Equipment Recommendations: none   Barriers to discharge: None    Assessment:     Shoaib Campbell is a 89 y.o. female admitted with a medical diagnosis of <principal problem not specified>. .      Consulted for debridement of lower leg wound on RLE. Patient states that she has hit that area multiple times and it is dry. Wound presents with dry skin around and dried blood and exudate. Scab removed with forceps and dry skin removed as well. Wound care nurse performed her wound cleaning.     Recent Surgery: * No surgery found *      Plan:     During this hospitalization, patient does not require further acute PT services.  Please re-consult if situation changes.      Subjective     Chief Complaint: none  Patient/Family Comments/goals: to go home  Pain/Comfort:       Patients cultural, spiritual, Mandaeism conflicts given the current situation:      Living Environment:  Lives at [facility]  Prior to admission, patients level of function was independent.  Equipment used at home: cane, straight, walker, rolling, wheelchair.  DME owned (not currently used): none.  Upon discharge, patient will have assistance from family.    Objective:     Communicated with nursing prior to session.  Patient found HOB elevated with peripheral IV upon PT entry to room.    General Precautions: Standard,      Orthopedic Precautions:N/A   Braces: N/A  Respiratory Status: Room air    Exams:  Skin Integrity/Edema:      -       wound on RLE requirign debridement    Functional Mobility:  N/A    AM-PAC 6 CLICK MOBILITY  Total Score:        Treatment and Education:  See above    AM-PAC 6 CLICK MOBILITY  Total Score:      Patient left HOB elevated with all lines intact, call button in reach, and nurse present.    GOALS:   Multidisciplinary Problems       Physical Therapy Goals        Not on file              Multidisciplinary Problems (Resolved)          Problem: Physical Therapy    Goal Priority Disciplines Outcome Interventions   Physical Therapy Goal   (Resolved)     PT, PT/OT Met    Description: Goals to be met by: discharge     Patient will increase functional independence with mobility by performin. Improve wound environment to promote wound healing                         DME Justifications:  No DME recommended requiring DME justifications    History:     Past Medical History:   Diagnosis Date    A-fib     Depression     Hypertension     Hypothyroidism, unspecified        History reviewed. No pertinent surgical history.    Time Tracking:     PT Received On: 25  PT Start Time: 1400     PT Stop Time: 1410  PT Total Time (min): 10 min     Billable Minutes: Evaluation 10      2025

## 2025-05-27 NOTE — PROGRESS NOTES
[facility]-Med/Surg  Wound Care    Patient Name:  Shoaib Campbell   MRN:  [id number]  Date: 5/27/2025  Diagnosis: <principal problem not specified>    History:     Past Medical History:   Diagnosis Date    A-fib     Depression     Hypertension     Hypothyroidism, unspecified        Social History[1]    Precautions:     Allergies as of 05/27/2025    (No Known Allergies)       WOC Assessment Details/Treatment        05/27/25 1359   WOCN Assessment   WOCN Total Time (mins) 40   Visit Date 05/27/25   Visit Time 1325   Consult Type New   WOCN Speciality Wound   Wound moisture;other   Procedure debridement  (done per PTAmarilis)   Intervention assessed;applied   Teaching on-going        Wound 05/27/25 1355 Traumatic Right distal;anterior Leg   Date First Assessed/Time First Assessed: 05/27/25 1355   Present on Original Admission: Yes  Primary Wound Type: Traumatic  Side: Right  Orientation: distal;anterior  Location: Leg   Wound Image     Dressing Appearance Open to air   Drainage Amount Small   Drainage Characteristics/Odor Purulent;No odor   Appearance Red;Moist   Red (%), Wound Tissue Color 100 %   Periwound Area Redness;Hematoma   Wound Edges Defined   Wound Length (cm) 1.1 cm   Wound Width (cm) 1.6 cm   Wound Depth (cm) 0.1 cm   Wound Volume (cm^3) 0.092 cm^3   Wound Surface Area (cm^2) 1.38 cm^2   Care Cleansed with:;Antimicrobial agent   Dressing Applied;Methylene blue/gentian violet;Foam   Dressing Change Due 05/30/25        Wound 05/27/25 1409 Moisture associated dermatitis Left lower Breast   Date First Assessed/Time First Assessed: 05/27/25 1409   Present on Original Admission: Yes  Primary Wound Type: Moisture associated dermatitis  Side: Left  Orientation: lower  Location: Breast   Wound Image      Orders placed.  Fungal rash noted to left lower breast, new order noted for antifungal powder BID.  Wound to right distal lower leg, debridement performed at bedside per CHAU Olmos.  Will treat with  hydrofera blue and foam every 3 days.  Patient reports that she bumped it multiple times.  Previous MD told her to let it stay open and dry.  Currently there is purulent drainage seeping from edges of dried scab, Instructed pt/family that scab is not stable and needs to be removed.  Proceeded with removal of scab per PT.     05/27/2025         [1]   Social History  Socioeconomic History    Marital status:    Tobacco Use    Smoking status: Never     Passive exposure: Never    Smokeless tobacco: Never   Substance and Sexual Activity    Alcohol use: Never    Drug use: Never    Sexual activity: Never     Social Drivers of Health     Financial Resource Strain: Low Risk  (5/27/2025)    Overall Financial Resource Strain (CARDIA)     Difficulty of Paying Living Expenses: Not hard at all   Food Insecurity: No Food Insecurity (5/27/2025)    Hunger Vital Sign     Worried About Running Out of Food in the Last Year: Never true     Ran Out of Food in the Last Year: Never true   Transportation Needs: No Transportation Needs (5/27/2025)    PRAPARE - Transportation     Lack of Transportation (Medical): No     Lack of Transportation (Non-Medical): No   Physical Activity: Unknown (5/27/2025)    Exercise Vital Sign     Days of Exercise per Week: 0 days   Stress: No Stress Concern Present (5/27/2025)    [employer] [place] [employer] - Occupational Stress Questionnaire     Feeling of Stress : Not at all   Housing Stability: Low Risk  (5/27/2025)    Housing Stability Vital Sign     Unable to Pay for Housing in the Last Year: No     Number of Times Moved in the Last Year: 0     Homeless in the Last Year: No

## 2025-05-27 NOTE — CONSULTS
[facility]    Cardiology  Consult Note    Patient Name: Shoaib Campbell  MRN: [id number]  Admission Date: 5/27/2025  Hospital Length of Stay: 0 days  Code Status: No Order   Attending Provider: Sakina Santiago MD   Consulting Provider: Karl Gama NP  Primary Care Physician: No primary care provider on file.  Principal Problem:<principal problem not specified>    Patient information was obtained from patient and ER records.     Subjective:     Chief Complaint/Reason for Consult: tachycardia       HPI: 89 year old female, unknown to [facility], with PMH of PA recently taken off OACF, depression, HTN, hypothyriodism, who presented to the ER with dizziness/lightheadedness. She reported she woke up this am feeling this way and had no issues when going to sleep the night prior. ER workup revealed elevated d-dimer , CTA was negative for PE. EKG revealed Sinus tachycardia. She was given IV lopressor with no response. [facility] was consulted for tachycardia.     Previous Cardiac Diagnostics:   Echo 2/21   Severe LV dysfunction, EF 15-20%      * Indeterminate diastology      * Moderate MR      * Moderate TR- RVSP 40 mm Hg      * IVC upper limit normal 2.0 cm       * No pericardial Effusion     Past Medical History:   Diagnosis Date    A-fib     Depression     Hypertension     Hypothyroidism, unspecified      History reviewed. No pertinent surgical history.  Review of patient's allergies indicates:  No Known Allergies  No current facility-administered medications on file prior to encounter.     Current Outpatient Medications on File Prior to Encounter   Medication Sig    amitriptyline (ELAVIL) 25 MG tablet Take 25 mg by mouth every evening.    cholestyramine (QUESTRAN) 4 gram packet Take 4 g by mouth once daily.    levothyroxine (SYNTHROID) 75 MCG tablet Take 75 mcg by mouth before breakfast.    metoprolol tartrate (LOPRESSOR) 25 MG tablet Take 12.5 mg by mouth 2 (two) times daily.    pantoprazole (PROTONIX) 40  "MG tablet Take 40 mg by mouth once daily.    XARELTO 20 mg Tab Take 20 mg by mouth once daily.     Family History    None       Tobacco Use    Smoking status: Never     Passive exposure: Never    Smokeless tobacco: Never   Substance and Sexual Activity    Alcohol use: Never    Drug use: Never    Sexual activity: Never       Review of Systems   Constitutional:  Positive for fatigue.   HENT: Negative.     Eyes: Negative.    Respiratory: Negative.     Cardiovascular: Negative.    Gastrointestinal: Negative.    Genitourinary: Negative.    Neurological:  Positive for weakness and light-headedness.     Objective:     Vital Signs (Most Recent):  Temp: 98.1 °F (36.7 °C) (05/27/25 0912)  Pulse: (!) 114 (05/27/25 0912)  Resp: 20 (05/27/25 0912)  BP: (!) 123/98 (05/27/25 0912)  SpO2: 97 % (05/27/25 0912) Vital Signs (24h Range):  Temp:  [97.8 °F (36.6 °C)-98.1 °F (36.7 °C)] 98.1 °F (36.7 °C)  Pulse:  [114-139] 114  Resp:  [16-20] 20  SpO2:  [95 %-97 %] 97 %  BP: (107-127)/(81-98) 123/98   Weight: 61.9 kg (136 lb 6.4 oz)  Body mass index is 26.64 kg/m².  SpO2: 97 %       Intake/Output Summary (Last 24 hours) at 5/27/2025 0941  Last data filed at 5/27/2025 0731  Gross per 24 hour   Intake --   Output 100 ml   Net -100 ml     Lines/Drains/Airways       Peripheral Intravenous Line  Duration                  Peripheral IV - Single Lumen 20 G Anterior;Left Forearm -- days                  Significant Labs:   Chemistries:   Recent Labs   Lab 05/27/25  0737 05/27/25  0830     --    K 4.1  --      --    CO2 28  --    BUN 19  --    CREATININE 0.96  --    CALCIUM 9.9  --    PROT 7.4  --    BILITOT 0.6  --    ALKPHOS 116  --    ALT 18  --    AST 35  --    MG 2.00  --    TROPONINI <0.012 <0.012        CBC/Anemia Labs: Coags:    Recent Labs   Lab 05/27/25  0737   WBC 8.13   HGB 13.1   HCT 40.0      MCV 90.9   RDW 13.7    No results for input(s): "PT", "INR", "APTT" in the last 168 hours.     Significant Imaging:  Imaging " Results              CTA Chest Non-Coronary (PE Studies) (Final result)  Result time 05/27/25 09:28:15      Final result by Joaquim Damon MD (05/27/25 09:28:15)                   Impression:      No evidence of pulmonary artery thromboembolic disease.      Electronically signed by: Joaquim Damon  Date:    05/27/2025  Time:    09:28               Narrative:    EXAMINATION:  CTA CHEST NON CORONARY (PE STUDIES)    CLINICAL HISTORY:  Pulmonary embolism (PE) suspected, positive D-dimer;    TECHNIQUE:  Axial CT images were obtained through the chest after IV administration of 80 cc Omnipaque 350 contrast.  MIP, coronal and sagittal reconstructions submitted and interpreted. Automated exposure control utilized. DLP: 127 mGy-cm    COMPARISON:  None    FINDINGS:  The visualized thyroid is unremarkable.    Pulmonary artery is normal in diameter. No filling defects are in the main pulmonary artery or segmental branches.    Heart is normal size.  Thoracic aorta is normal in caliber.    Central airways are clear.    No focal consolidation, pneumothorax or pleural effusion.  Mild dependent lung changes are present.  Left lower lobe calcified granuloma.    No pathologically enlarged mediastinal or axillary lymph nodes.    No acute osseous findings.    Visualized portions of the upper abdomen show no acute findings.                                       X-Ray Chest AP Portable (Final result)  Result time 05/27/25 07:32:19      Final result by Joaquim Damon MD (05/27/25 07:32:19)                   Impression:      No radiographic evidence of an acute cardiopulmonary process.      Electronically signed by: Joaquim Damon  Date:    05/27/2025  Time:    07:32               Narrative:    EXAMINATION:  XR CHEST AP PORTABLE    CLINICAL HISTORY:  Weakness    COMPARISON:  None    FINDINGS:  Single AP chest radiograph is provided for evaluation.    Cardiac silhouette is normal in size.    No focal consolidation, pneumothorax, or  pleural effusion.    No acute osseous findings.                                    EKG:     Telemetry:  ST    Physical Exam  Constitutional:       Appearance: Normal appearance.   HENT:      Head: Normocephalic.   Eyes:      Extraocular Movements: Extraocular movements intact.   Cardiovascular:      Rate and Rhythm: Regular rhythm. Tachycardia present.   Pulmonary:      Effort: Pulmonary effort is normal.   Neurological:      General: No focal deficit present.      Mental Status: She is alert and oriented to person, place, and time.   Psychiatric:         Mood and Affect: Mood normal.         Behavior: Behavior normal.       Home Medications:   Medications Ordered Prior to Encounter[1]  Current Schedule Inpatient Medications:   sodium chloride 0.9%  500 mL Intravenous ED 1 Time     Continuous Infusions:    Assessment:   Tachycardia (SVT vs sinus tach with LBBB)- converted to SR  Hypotension   Weakness/lightheadedness  Chronic Systolic HF      Plan:   Case discussed and EKG reviewed by cardiologist  CTA negative for PE  Pt converted to SR afer fluid bolus  OK to keep at current facility   Start low dose BB or digoxin 0.125 daily if pt  cannot tolerate BB.      Thank you for your consult.     Karl Gama NP  Cardiology  [facility]         [1]   No current facility-administered medications on file prior to encounter.     Current Outpatient Medications on File Prior to Encounter   Medication Sig Dispense Refill    amitriptyline (ELAVIL) 25 MG tablet Take 25 mg by mouth every evening.      cholestyramine (QUESTRAN) 4 gram packet Take 4 g by mouth once daily.      levothyroxine (SYNTHROID) 75 MCG tablet Take 75 mcg by mouth before breakfast.      metoprolol tartrate (LOPRESSOR) 25 MG tablet Take 12.5 mg by mouth 2 (two) times daily.      pantoprazole (PROTONIX) 40 MG tablet Take 40 mg by mouth once daily.      XARELTO 20 mg Tab Take 20 mg by mouth once daily.

## 2025-05-28 VITALS
BODY MASS INDEX: 26.44 KG/M2 | HEART RATE: 63 BPM | TEMPERATURE: 98 F | HEIGHT: 60 IN | OXYGEN SATURATION: 98 % | WEIGHT: 134.69 LBS | SYSTOLIC BLOOD PRESSURE: 145 MMHG | DIASTOLIC BLOOD PRESSURE: 67 MMHG | RESPIRATION RATE: 18 BRPM

## 2025-05-28 LAB
OHS QRS DURATION: 126 MS
OHS QRS DURATION: 130 MS
OHS QTC CALCULATION: 468 MS
OHS QTC CALCULATION: 519 MS

## 2025-05-28 PROCEDURE — 25000003 PHARM REV CODE 250: Performed by: OTOLARYNGOLOGY

## 2025-05-28 PROCEDURE — G0378 HOSPITAL OBSERVATION PER HR: HCPCS

## 2025-05-28 PROCEDURE — 94761 N-INVAS EAR/PLS OXIMETRY MLT: CPT

## 2025-05-28 RX ORDER — METOPROLOL TARTRATE 25 MG/1
12.5 TABLET, FILM COATED ORAL 2 TIMES DAILY
Qty: 90 TABLET | Refills: 0 | Status: SHIPPED | OUTPATIENT
Start: 2025-05-28

## 2025-05-28 RX ADMIN — PANTOPRAZOLE SODIUM 40 MG: 40 TABLET, DELAYED RELEASE ORAL at 09:05

## 2025-05-28 RX ADMIN — CHOLESTYRAMINE 4 G: 4 POWDER, FOR SUSPENSION ORAL at 09:05

## 2025-05-28 RX ADMIN — LEVOTHYROXINE SODIUM 75 MCG: 0.07 TABLET ORAL at 05:05

## 2025-05-28 RX ADMIN — MICONAZOLE NITRATE 2 % TOPICAL POWDER: at 09:05

## 2025-05-28 RX ADMIN — SENNOSIDES AND DOCUSATE SODIUM 1 TABLET: 8.6; 5 TABLET ORAL at 09:05

## 2025-05-28 RX ADMIN — METOPROLOL TARTRATE 12.5 MG: 25 TABLET, FILM COATED ORAL at 09:05

## 2025-05-28 NOTE — PLAN OF CARE
05/28/25 1118   Final Note   Assessment Type Final Discharge Note   Anticipated Discharge Disposition Home-Health   What phone number can be called within the next 1-3 days to see how you are doing after discharge? [phone]   Post-Acute Status   Discharge Delays None known at this time

## 2025-05-28 NOTE — PLAN OF CARE
05/28/25 0939   Discharge Reassessment   Assessment Type Discharge Planning Reassessment   Did the patient's condition or plan change since previous assessment? No   Discharge Plan discussed with: Patient   Communicated JUAN with patient/caregiver Yes   Discharge Plan A Home Health   Discharge Plan B Home Health   DME Needed Upon Discharge  none   Transition of Care Barriers None   Why the patient remains in the hospital Requires continued medical care   Post-Acute Status   Discharge Delays None known at this time        05/28/25 0939   Discharge Reassessment   Assessment Type Discharge Planning Reassessment   Did the patient's condition or plan change since previous assessment? No   Discharge Plan discussed with: Patient   Communicated JUAN with patient/caregiver Yes   Discharge Plan A Home Health   Discharge Plan B Home Health   DME Needed Upon Discharge  none   Transition of Care Barriers None   Why the patient remains in the hospital Requires continued medical care   Post-Acute Status   Discharge Delays None known at this time        05/28/25 0939   Discharge Reassessment   Assessment Type Discharge Planning Reassessment   Did the patient's condition or plan change since previous assessment? No   Discharge Plan discussed with: Patient   Communicated JUAN with patient/caregiver Yes   Discharge Plan A Home Health   Discharge Plan B Home Health   DME Needed Upon Discharge  none   Transition of Care Barriers None   Why the patient remains in the hospital Requires continued medical care   Post-Acute Status   Discharge Delays None known at this time

## 2025-05-28 NOTE — PLAN OF CARE
Physician ordered to discharge patient home. Pt's home health care provider ( [facility] ) was notified per phone/fax of pt's discharge, spoke with [facility] nurse to resume home visits.

## 2025-05-28 NOTE — DISCHARGE SUMMARY
Hospital Medicine  Discharge Summary    Patient Name: Shoaib Campbell  MRN: [id number]  Admit Date: 5/27/2025  Discharge Date:    Status: OP- Observation   Length of Stay: 0      PHYSICIANS   Admitting Physician: Ranjeet Eckert MD  Discharging Physician: Ranjeet Eckert MD.  Primary Care Physician: No primary care provider on file.  Consults: {consultation: 42662}      DISCHARGE DIAGNOSES   ***      PROCEDURES   * No surgery found *   ***      HOSPITAL COURSE    ***      STATUS  Improved***Stable    DISPOSITION  Discharge to {Discharge recommendations:85673}    DIET  ***    ACTIVITY  As tolerated  ***    FOLLOW-UP         DISCHARGE MEDICATION RECONCILIATION        Medication List        CONTINUE taking these medications      aspirin 325 MG EC tablet  Commonly known as: ECOTRIN     calcium carbonate 600 mg calcium (1,500 mg) Tab  Commonly known as: OS-INNA     cholestyramine 4 gram packet  Commonly known as: QUESTRAN     levothyroxine 75 MCG tablet  Commonly known as: SYNTHROID     metoprolol tartrate 25 MG tablet  Commonly known as: LOPRESSOR  Take 0.5 tablets (12.5 mg total) by mouth 2 (two) times daily.     ONE DAILY MULTIVITAMIN per tablet  Generic drug: multivitamin     pantoprazole 40 MG tablet  Commonly known as: PROTONIX               Where to Get Your Medications        These medications were sent to [facility] [address] [facility] [place] [address]      Phone: [phone]   metoprolol tartrate 25 MG tablet           PHYSICAL EXAM   VITALS: T 98 °F (36.7 °C)   /65   P 63   RR 18   O2 97 %    ***        DIAGNOSITCS   CBC:   Recent Labs   Lab 05/27/25  0737   WBC 8.13   HGB 13.1   HCT 40.0          CMP:   Recent Labs   Lab 05/27/25  0737      CALCIUM 9.9   ALBUMIN 4.1   PROT 7.4      K 4.1   CO2 28      BUN 19   CREATININE 0.96   ALKPHOS 116   ALT 18   AST 35   BILITOT 0.6   MG 2.00     Estimated Creatinine Clearance: 27.3 mL/min  "(based on SCr of 0.96 mg/dL).    {:[id number]}     COAG:  No results for input(s): "APTT", "INR", "PTT" in the last 168 hours.    CARDIAC ENZYMES:   Recent Labs     05/27/25  0737 05/27/25  0830   TROPONINI <0.012 <0.012        No results for input(s): "AMYLASE", "LIPASE" in the last 168 hours.    No results for input(s): "POCTGLUCOSE" in the last 72 hours.     Microbiology Results (last 7 days)       ** No results found for the last 168 hours. **             No results for input(s): "CHOL", "TRIG", "HDL", "LDL" in the last 72 hours. No results found for: "LABA1C", "HGBA1C"     CTA Chest Non-Coronary (PE Studies)  Result Date: 5/27/2025  EXAMINATION: CTA CHEST NON CORONARY (PE STUDIES) CLINICAL HISTORY: Pulmonary embolism (PE) suspected, positive D-dimer; TECHNIQUE: Axial CT images were obtained through the chest after IV administration of 80 cc Omnipaque 350 contrast.  MIP, coronal and sagittal reconstructions submitted and interpreted. Automated exposure control utilized. DLP: 127 mGy-cm COMPARISON: None FINDINGS: The visualized thyroid is unremarkable. Pulmonary artery is normal in diameter. No filling defects are in the main pulmonary artery or segmental branches. Heart is normal size.  Thoracic aorta is normal in caliber. Central airways are clear. No focal consolidation, pneumothorax or pleural effusion.  Mild dependent lung changes are present.  Left lower lobe calcified granuloma. No pathologically enlarged mediastinal or axillary lymph nodes. No acute osseous findings. Visualized portions of the upper abdomen show no acute findings.     No evidence of pulmonary artery thromboembolic disease. Electronically signed by: Joaquim Damon Date:    05/27/2025 Time:    09:28    X-Ray Chest AP Portable  Result Date: 5/27/2025  EXAMINATION: XR CHEST AP PORTABLE CLINICAL HISTORY: Weakness COMPARISON: None FINDINGS: Single AP chest radiograph is provided for evaluation. Cardiac silhouette is normal in size. No focal " consolidation, pneumothorax, or pleural effusion. No acute osseous findings.     No radiographic evidence of an acute cardiopulmonary process. Electronically signed by: Joaquim Damon Date:    2025 Time:    07:32      {:}     Patient Screened for food insecurity, housing instability, transportation needs, utility difficulties, and interpersonal safety.  {SBSDOHSCREENIN}    Discharge time: 33 minutes         Ranjeet Eckert MD  [facility]

## 2025-05-29 ENCOUNTER — PATIENT OUTREACH (OUTPATIENT)
Dept: ADMINISTRATIVE | Facility: CLINIC | Age: OVER 89
End: 2025-05-29
Payer: MEDICARE

## 2025-05-29 NOTE — PROGRESS NOTES
C3 nurse spoke with hSoaib Campbell for a TCC post hospital discharge follow up call. The patient has a scheduled HOSFU appointment with Lyle Milner MD Monday 6/2/2025 at 1:20 PM

## 2025-07-11 ENCOUNTER — LAB REQUISITION (OUTPATIENT)
Dept: LAB | Facility: HOSPITAL | Age: OVER 89
End: 2025-07-11
Payer: MEDICARE

## 2025-07-11 DIAGNOSIS — N39.0 URINARY TRACT INFECTION, SITE NOT SPECIFIED: ICD-10-CM

## 2025-07-11 LAB
BACTERIA #/AREA URNS AUTO: NORMAL /HPF
BILIRUB UR QL STRIP.AUTO: NEGATIVE
CLARITY UR: CLEAR
COLOR UR AUTO: YELLOW
GLUCOSE UR QL STRIP: NEGATIVE
HGB UR QL STRIP: ABNORMAL
KETONES UR QL STRIP: NEGATIVE
LEUKOCYTE ESTERASE UR QL STRIP: ABNORMAL
NITRITE UR QL STRIP: NEGATIVE
PH UR STRIP: 6 [PH]
PROT UR QL STRIP: NEGATIVE
RBC #/AREA URNS AUTO: NORMAL /HPF
SP GR UR STRIP.AUTO: <=1.005 (ref 1–1.03)
SQUAMOUS #/AREA URNS AUTO: NORMAL /HPF
UROBILINOGEN UR STRIP-ACNC: 0.2
WBC #/AREA URNS AUTO: NORMAL /HPF

## 2025-07-11 PROCEDURE — 81003 URINALYSIS AUTO W/O SCOPE: CPT | Performed by: INTERNAL MEDICINE

## 2025-07-11 PROCEDURE — 87186 SC STD MICRODIL/AGAR DIL: CPT | Mod: 91 | Performed by: INTERNAL MEDICINE

## 2025-07-11 PROCEDURE — 81015 MICROSCOPIC EXAM OF URINE: CPT | Mod: XB | Performed by: INTERNAL MEDICINE

## 2025-07-14 LAB
BACTERIA UR CULT: ABNORMAL
BACTERIA UR CULT: ABNORMAL

## 2025-08-11 ENCOUNTER — LAB REQUISITION (OUTPATIENT)
Dept: LAB | Facility: HOSPITAL | Age: OVER 89
End: 2025-08-11
Payer: MEDICARE

## 2025-08-11 DIAGNOSIS — N39.0 URINARY TRACT INFECTION, SITE NOT SPECIFIED: ICD-10-CM

## 2025-08-11 LAB
BILIRUB UR QL STRIP.AUTO: NEGATIVE
CLARITY UR: CLEAR
COLOR UR AUTO: YELLOW
GLUCOSE UR QL STRIP: NEGATIVE
HGB UR QL STRIP: NEGATIVE
KETONES UR QL STRIP: NEGATIVE
LEUKOCYTE ESTERASE UR QL STRIP: ABNORMAL
NITRITE UR QL STRIP: NEGATIVE
PH UR STRIP: 6 [PH]
PROT UR QL STRIP: NEGATIVE
RBC #/AREA URNS AUTO: NORMAL /HPF
SP GR UR STRIP.AUTO: <=1.005 (ref 1–1.03)
UROBILINOGEN UR STRIP-ACNC: 0.2
WBC #/AREA URNS AUTO: NORMAL /HPF

## 2025-08-11 PROCEDURE — 81015 MICROSCOPIC EXAM OF URINE: CPT | Mod: XB | Performed by: INTERNAL MEDICINE

## 2025-08-11 PROCEDURE — 87086 URINE CULTURE/COLONY COUNT: CPT | Performed by: INTERNAL MEDICINE

## 2025-08-11 PROCEDURE — 81003 URINALYSIS AUTO W/O SCOPE: CPT | Performed by: INTERNAL MEDICINE

## 2025-08-12 ENCOUNTER — HOSPITAL ENCOUNTER (OUTPATIENT)
Facility: HOSPITAL | Age: OVER 89
Discharge: HOME | End: 2025-08-13
Attending: FAMILY MEDICINE | Admitting: FAMILY MEDICINE
Payer: MEDICARE

## 2025-08-12 DIAGNOSIS — I48.91 A-FIB: Primary | ICD-10-CM

## 2025-08-12 DIAGNOSIS — I48.92 ATRIAL FLUTTER WITH RAPID VENTRICULAR RESPONSE: ICD-10-CM

## 2025-08-12 LAB
ALBUMIN SERPL-MCNC: 3.5 G/DL (ref 3.4–4.8)
ALBUMIN/GLOB SERPL: 1.2 RATIO (ref 1.1–2)
ALP SERPL-CCNC: 99 UNIT/L (ref 40–150)
ALT SERPL-CCNC: 13 UNIT/L (ref 0–55)
ANION GAP SERPL CALC-SCNC: 9 MEQ/L
AORTIC SIZE INDEX: 1.4 CM/M2
AORTIC VALVE CUSP SEPERATION: 0.48 CM
ASCENDING AORTA: 2.2 CM
AST SERPL-CCNC: 33 UNIT/L (ref 11–45)
AV INDEX (PROSTH): 0.38
AV MEAN GRADIENT: 9 MMHG
AV PEAK GRADIENT: 19 MMHG
AV VALVE AREA BY VELOCITY RATIO: 1 CM²
AV VALVE AREA: 1.2 CM²
AV VELOCITY RATIO: 0.32
BACTERIA #/AREA URNS AUTO: NORMAL /HPF
BASOPHILS # BLD AUTO: 0.04 X10(3)/MCL (ref 0.01–0.08)
BASOPHILS NFR BLD AUTO: 0.4 % (ref 0.1–1.2)
BILIRUB SERPL-MCNC: 0.4 MG/DL
BILIRUB UR QL STRIP.AUTO: NEGATIVE
BSA FOR ECHO PROCEDURE: 1.61 M2
BUN SERPL-MCNC: 28 MG/DL (ref 9.8–20.1)
CALCIUM SERPL-MCNC: 9.3 MG/DL (ref 8.4–10.2)
CHLORIDE SERPL-SCNC: 111 MMOL/L (ref 98–111)
CLARITY UR: CLEAR
CO2 SERPL-SCNC: 22 MMOL/L (ref 23–31)
COLOR UR AUTO: YELLOW
CREAT SERPL-MCNC: 0.93 MG/DL (ref 0.55–1.02)
CREAT/UREA NIT SERPL: 30
CV ECHO LV RWT: 0.48 CM
D DIMER PPP IA.FEU-MCNC: 0.81 MG/L FEU (ref 0.19–0.5)
DOP CALC AO PEAK VEL: 2.2 M/S
DOP CALC AO VTI: 41.3 CM
DOP CALC LVOT AREA: 3.1 CM2
DOP CALC LVOT DIAMETER: 2 CM
DOP CALC LVOT PEAK VEL: 0.7 M/S
DOP CALC MV VTI: 19.3 CM
DOP CALCLVOT PEAK VEL VTI: 15.8 CM
E WAVE DECELERATION TIME: 293 MSEC
ECHO LV POSTERIOR WALL: 1 CM (ref 0.6–1.1)
EOSINOPHIL # BLD AUTO: 0.07 X10(3)/MCL (ref 0.04–0.36)
EOSINOPHIL NFR BLD AUTO: 0.7 % (ref 0.7–7)
ERYTHROCYTE [DISTWIDTH] IN BLOOD BY AUTOMATED COUNT: 14.6 % (ref 11–14.5)
FRACTIONAL SHORTENING: 31 % (ref 28–44)
GFR SERPLBLD CREATININE-BSD FMLA CKD-EPI: 56 ML/MIN/1.73/M2
GLOBULIN SER-MCNC: 3 GM/DL (ref 2.4–3.5)
GLUCOSE SERPL-MCNC: 129 MG/DL (ref 75–121)
GLUCOSE UR QL STRIP: NEGATIVE
HCT VFR BLD AUTO: 42.3 % (ref 36–48)
HGB BLD-MCNC: 13.9 G/DL (ref 11.8–16)
HGB UR QL STRIP: ABNORMAL
IMM GRANULOCYTES # BLD AUTO: 0.02 X10(3)/MCL (ref 0–0.03)
IMM GRANULOCYTES NFR BLD AUTO: 0.2 % (ref 0–0.5)
INFERIOR VENA CAVA SIZE SNIFF: 0.6 CM
INTERVENTRICULAR SEPTUM: 0.9 CM (ref 0.6–1.1)
IVC DIAMETER: 1.4 CM
KETONES UR QL STRIP: NEGATIVE
LEFT ATRIUM SIZE: 4.1 CM
LEFT ATRIUM VOLUME INDEX MOD: 31 ML/M2
LEFT ATRIUM VOLUME MOD: 49 ML
LEFT INTERNAL DIMENSION IN SYSTOLE: 2.9 CM (ref 2.1–4)
LEFT VENTRICLE DIASTOLIC VOLUME INDEX: 49.37 ML/M2
LEFT VENTRICLE DIASTOLIC VOLUME: 78 ML
LEFT VENTRICLE END SYSTOLIC VOLUME APICAL 2 CHAMBER: 45.8 ML
LEFT VENTRICLE END SYSTOLIC VOLUME APICAL 4 CHAMBER: 45.1 ML
LEFT VENTRICLE MASS INDEX: 80.9 G/M2
LEFT VENTRICLE SYSTOLIC VOLUME INDEX: 19.6 ML/M2
LEFT VENTRICLE SYSTOLIC VOLUME: 31 ML
LEFT VENTRICULAR INTERNAL DIMENSION IN DIASTOLE: 4.2 CM (ref 3.5–6)
LEFT VENTRICULAR MASS: 127.8 G
LEUKOCYTE ESTERASE UR QL STRIP: NEGATIVE
LVED V (TEICH): 77.7 ML
LVES V (TEICH): 30.9 ML
LVOT MG: 1 MMHG
LVOT MV: 0.44 CM/S
LYMPHOCYTES # BLD AUTO: 2.79 X10(3)/MCL (ref 1.16–3.74)
LYMPHOCYTES NFR BLD AUTO: 28 % (ref 20–55)
Lab: 1.4 CM/M
MAGNESIUM SERPL-MCNC: 2 MG/DL (ref 1.6–2.6)
MCH RBC QN AUTO: 28.8 PG (ref 27–34)
MCHC RBC AUTO-ENTMCNC: 32.9 G/DL (ref 31–37)
MCV RBC AUTO: 87.8 FL (ref 79–99)
MONOCYTES # BLD AUTO: 0.9 X10(3)/MCL (ref 0.24–0.36)
MONOCYTES NFR BLD AUTO: 9 % (ref 4.7–12.5)
MV MEAN GRADIENT: 1 MMHG
MV PEAK A VEL: 0.55 M/S
MV PEAK GRADIENT: 2 MMHG
MV STENOSIS PRESSURE HALF TIME: 53 MS
MV VALVE AREA BY CONTINUITY EQUATION: 2.57 CM2
MV VALVE AREA P 1/2 METHOD: 4.15 CM2
NEUTROPHILS # BLD AUTO: 6.14 X10(3)/MCL (ref 1.56–6.13)
NEUTROPHILS NFR BLD AUTO: 61.7 % (ref 37–73)
NITRITE UR QL STRIP: NEGATIVE
NRBC BLD AUTO-RTO: 0 %
NT-PROBNP SERPL-MCNC: 5701 PG/ML
OHS CV CPX PATIENT HEIGHT IN: 60
OHS CV RV/LV RATIO: 0.83 CM
OHS QRS DURATION: 124 MS
OHS QTC CALCULATION: 530 MS
PH UR STRIP: 5.5 [PH]
PISA TR MAX VEL: 2.2 M/S
PLATELET # BLD AUTO: 224 X10(3)/MCL (ref 140–371)
PMV BLD AUTO: 9.6 FL (ref 9.4–12.4)
POTASSIUM SERPL-SCNC: 4 MMOL/L (ref 3.5–5.1)
PROT SERPL-MCNC: 6.5 GM/DL (ref 5.8–7.6)
PROT UR QL STRIP: NEGATIVE
RA MAJOR: 4.2 CM
RA PRESSURE ESTIMATED: 8 MMHG
RBC # BLD AUTO: 4.82 X10(6)/MCL (ref 4–5.1)
RBC #/AREA URNS AUTO: NORMAL /HPF
RIGHT ATRIAL AREA: 11.5 CM2
RIGHT ATRIUM END SYSTOLIC VOLUME APICAL 4 CHAMBER INDEX BSA: 16.39 ML/M2
RIGHT ATRIUM VOLUME AREA LENGTH APICAL 4 CHAMBER: 25.9 ML
RIGHT VENTRICLE DIASTOLIC BASEL DIMENSION: 3.5 CM
RV TB RVSP: 10 MMHG
SODIUM SERPL-SCNC: 142 MMOL/L (ref 136–145)
SP GR UR STRIP.AUTO: 1.01 (ref 1–1.03)
SQUAMOUS #/AREA URNS AUTO: NORMAL /HPF
TDI LATERAL: 0.11 M/S
TDI SEPTAL: 0.11 M/S
TDI: 0.11 M/S
TR MAX PG: 20 MMHG
TRICUSPID ANNULAR PLANE SYSTOLIC EXCURSION: 1.8 CM
TROPONIN I SERPL HS-MCNC: 130 NG/L
TROPONIN I SERPL HS-MCNC: 137 NG/L
TSH SERPL-ACNC: 0.72 UIU/ML (ref 0.35–4.94)
TV REST PULMONARY ARTERY PRESSURE: 27 MMHG
UROBILINOGEN UR STRIP-ACNC: 0.2
WBC # BLD AUTO: 9.96 X10(3)/MCL (ref 4–11.5)
WBC #/AREA URNS AUTO: NORMAL /HPF
Z-SCORE OF LEFT VENTRICULAR DIMENSION IN END DIASTOLE: -0.75
Z-SCORE OF LEFT VENTRICULAR DIMENSION IN END SYSTOLE: 0.26

## 2025-08-12 PROCEDURE — 83880 ASSAY OF NATRIURETIC PEPTIDE: CPT | Performed by: FAMILY MEDICINE

## 2025-08-12 PROCEDURE — 81015 MICROSCOPIC EXAM OF URINE: CPT | Mod: XB | Performed by: FAMILY MEDICINE

## 2025-08-12 PROCEDURE — 96360 HYDRATION IV INFUSION INIT: CPT | Mod: XU

## 2025-08-12 PROCEDURE — 93010 ELECTROCARDIOGRAM REPORT: CPT | Mod: ,,, | Performed by: INTERNAL MEDICINE

## 2025-08-12 PROCEDURE — G0378 HOSPITAL OBSERVATION PER HR: HCPCS

## 2025-08-12 PROCEDURE — 25000003 PHARM REV CODE 250: Performed by: FAMILY MEDICINE

## 2025-08-12 PROCEDURE — 84443 ASSAY THYROID STIM HORMONE: CPT | Performed by: FAMILY MEDICINE

## 2025-08-12 PROCEDURE — 81003 URINALYSIS AUTO W/O SCOPE: CPT | Performed by: FAMILY MEDICINE

## 2025-08-12 PROCEDURE — 25000003 PHARM REV CODE 250: Performed by: NURSE PRACTITIONER

## 2025-08-12 PROCEDURE — 83735 ASSAY OF MAGNESIUM: CPT | Performed by: FAMILY MEDICINE

## 2025-08-12 PROCEDURE — 99285 EMERGENCY DEPT VISIT HI MDM: CPT | Mod: 25

## 2025-08-12 PROCEDURE — 96372 THER/PROPH/DIAG INJ SC/IM: CPT | Performed by: FAMILY MEDICINE

## 2025-08-12 PROCEDURE — 63600175 PHARM REV CODE 636 W HCPCS: Performed by: FAMILY MEDICINE

## 2025-08-12 PROCEDURE — 84484 ASSAY OF TROPONIN QUANT: CPT | Mod: 91 | Performed by: FAMILY MEDICINE

## 2025-08-12 PROCEDURE — 94761 N-INVAS EAR/PLS OXIMETRY MLT: CPT

## 2025-08-12 PROCEDURE — 80053 COMPREHEN METABOLIC PANEL: CPT | Performed by: FAMILY MEDICINE

## 2025-08-12 PROCEDURE — 93005 ELECTROCARDIOGRAM TRACING: CPT

## 2025-08-12 PROCEDURE — 85025 COMPLETE CBC W/AUTO DIFF WBC: CPT | Performed by: FAMILY MEDICINE

## 2025-08-12 PROCEDURE — 85379 FIBRIN DEGRADATION QUANT: CPT | Performed by: FAMILY MEDICINE

## 2025-08-12 RX ORDER — DIGOXIN 0.25 MG/ML
250 INJECTION INTRAMUSCULAR; INTRAVENOUS EVERY 6 HOURS PRN
Status: DISCONTINUED | OUTPATIENT
Start: 2025-08-12 | End: 2025-08-13 | Stop reason: HOSPADM

## 2025-08-12 RX ORDER — LEVOTHYROXINE SODIUM 75 UG/1
75 TABLET ORAL
Status: DISCONTINUED | OUTPATIENT
Start: 2025-08-13 | End: 2025-08-13 | Stop reason: HOSPADM

## 2025-08-12 RX ORDER — SUCRALFATE 1 G/10ML
1 SUSPENSION ORAL EVERY 6 HOURS
Status: DISCONTINUED | OUTPATIENT
Start: 2025-08-12 | End: 2025-08-12

## 2025-08-12 RX ORDER — ENOXAPARIN SODIUM 100 MG/ML
30 INJECTION SUBCUTANEOUS EVERY 24 HOURS
Status: DISCONTINUED | OUTPATIENT
Start: 2025-08-12 | End: 2025-08-13 | Stop reason: HOSPADM

## 2025-08-12 RX ORDER — METOPROLOL TARTRATE 25 MG/1
25 TABLET, FILM COATED ORAL 2 TIMES DAILY
Status: DISCONTINUED | OUTPATIENT
Start: 2025-08-12 | End: 2025-08-13 | Stop reason: HOSPADM

## 2025-08-12 RX ORDER — ALUMINUM HYDROXIDE, MAGNESIUM HYDROXIDE, AND SIMETHICONE 1200; 120; 1200 MG/30ML; MG/30ML; MG/30ML
30 SUSPENSION ORAL
Status: DISCONTINUED | OUTPATIENT
Start: 2025-08-12 | End: 2025-08-12

## 2025-08-12 RX ORDER — SODIUM CHLORIDE 0.9 % (FLUSH) 0.9 %
3 SYRINGE (ML) INJECTION EVERY 6 HOURS PRN
Status: DISCONTINUED | OUTPATIENT
Start: 2025-08-12 | End: 2025-08-13 | Stop reason: HOSPADM

## 2025-08-12 RX ORDER — METOPROLOL TARTRATE 1 MG/ML
5 INJECTION, SOLUTION INTRAVENOUS
Status: DISCONTINUED | OUTPATIENT
Start: 2025-08-12 | End: 2025-08-13 | Stop reason: HOSPADM

## 2025-08-12 RX ORDER — ASPIRIN 325 MG
325 TABLET, DELAYED RELEASE (ENTERIC COATED) ORAL DAILY
Status: DISCONTINUED | OUTPATIENT
Start: 2025-08-13 | End: 2025-08-13 | Stop reason: HOSPADM

## 2025-08-12 RX ORDER — SUCRALFATE 1 G/10ML
1 SUSPENSION ORAL
Status: DISCONTINUED | OUTPATIENT
Start: 2025-08-12 | End: 2025-08-13 | Stop reason: HOSPADM

## 2025-08-12 RX ORDER — CHOLESTYRAMINE 4 G/9G
1 POWDER, FOR SUSPENSION ORAL DAILY
Status: DISCONTINUED | OUTPATIENT
Start: 2025-08-13 | End: 2025-08-13 | Stop reason: HOSPADM

## 2025-08-12 RX ORDER — TALC
6 POWDER (GRAM) TOPICAL NIGHTLY PRN
Status: DISCONTINUED | OUTPATIENT
Start: 2025-08-12 | End: 2025-08-13 | Stop reason: HOSPADM

## 2025-08-12 RX ORDER — CALCIUM CARBONATE 200(500)MG
500 TABLET,CHEWABLE ORAL DAILY
Status: DISCONTINUED | OUTPATIENT
Start: 2025-08-13 | End: 2025-08-13 | Stop reason: HOSPADM

## 2025-08-12 RX ORDER — ONDANSETRON HYDROCHLORIDE 2 MG/ML
4 INJECTION, SOLUTION INTRAVENOUS EVERY 6 HOURS PRN
Status: DISCONTINUED | OUTPATIENT
Start: 2025-08-12 | End: 2025-08-13 | Stop reason: HOSPADM

## 2025-08-12 RX ADMIN — SUCRALFATE ORAL SUSPENSION 1 G: 1 SUSPENSION ORAL at 11:08

## 2025-08-12 RX ADMIN — ALUMINUM HYDROXIDE, MAGNESIUM HYDROXIDE, AND DIMETHICONE 30 ML: 200; 20; 200 SUSPENSION ORAL at 11:08

## 2025-08-12 RX ADMIN — SUCRALFATE ORAL SUSPENSION 1 G: 1 SUSPENSION ORAL at 04:08

## 2025-08-12 RX ADMIN — METOPROLOL TARTRATE 25 MG: 25 TABLET, FILM COATED ORAL at 08:08

## 2025-08-12 RX ADMIN — SODIUM CHLORIDE 500 ML: 9 INJECTION, SOLUTION INTRAVENOUS at 08:08

## 2025-08-12 RX ADMIN — ENOXAPARIN SODIUM 30 MG: 30 INJECTION SUBCUTANEOUS at 04:08

## 2025-08-12 RX ADMIN — SUCRALFATE ORAL SUSPENSION 1 G: 1 SUSPENSION ORAL at 08:08

## 2025-08-12 RX ADMIN — MELATONIN TAB 3 MG 6 MG: 3 TAB at 08:08

## 2025-08-12 RX ADMIN — METOPROLOL TARTRATE 25 MG: 25 TABLET, FILM COATED ORAL at 10:08

## 2025-08-13 VITALS
TEMPERATURE: 97 F | OXYGEN SATURATION: 96 % | WEIGHT: 135.81 LBS | RESPIRATION RATE: 18 BRPM | SYSTOLIC BLOOD PRESSURE: 148 MMHG | HEART RATE: 62 BPM | DIASTOLIC BLOOD PRESSURE: 70 MMHG | BODY MASS INDEX: 26.66 KG/M2 | HEIGHT: 60 IN

## 2025-08-13 PROBLEM — I48.92 ATRIAL FLUTTER WITH RAPID VENTRICULAR RESPONSE: Status: ACTIVE | Noted: 2025-08-13

## 2025-08-13 LAB
ALBUMIN SERPL-MCNC: 3.2 G/DL (ref 3.4–4.8)
ALBUMIN/GLOB SERPL: 1.1 RATIO (ref 1.1–2)
ALP SERPL-CCNC: 88 UNIT/L (ref 40–150)
ALT SERPL-CCNC: 13 UNIT/L (ref 0–55)
ANION GAP SERPL CALC-SCNC: 8 MEQ/L
AST SERPL-CCNC: 29 UNIT/L (ref 11–45)
BACTERIA UR CULT: NO GROWTH
BASOPHILS # BLD AUTO: 0.05 X10(3)/MCL (ref 0.01–0.08)
BASOPHILS NFR BLD AUTO: 0.6 % (ref 0.1–1.2)
BILIRUB SERPL-MCNC: 0.6 MG/DL
BUN SERPL-MCNC: 24 MG/DL (ref 9.8–20.1)
CALCIUM SERPL-MCNC: 8.8 MG/DL (ref 8.4–10.2)
CHLORIDE SERPL-SCNC: 111 MMOL/L (ref 98–111)
CO2 SERPL-SCNC: 24 MMOL/L (ref 23–31)
CREAT SERPL-MCNC: 0.89 MG/DL (ref 0.55–1.02)
CREAT/UREA NIT SERPL: 27
EOSINOPHIL # BLD AUTO: 0.09 X10(3)/MCL (ref 0.04–0.36)
EOSINOPHIL NFR BLD AUTO: 1.2 % (ref 0.7–7)
ERYTHROCYTE [DISTWIDTH] IN BLOOD BY AUTOMATED COUNT: 14.6 % (ref 11–14.5)
GFR SERPLBLD CREATININE-BSD FMLA CKD-EPI: 59 ML/MIN/1.73/M2
GLOBULIN SER-MCNC: 2.9 GM/DL (ref 2.4–3.5)
GLUCOSE SERPL-MCNC: 93 MG/DL (ref 75–121)
HCT VFR BLD AUTO: 38.3 % (ref 36–48)
HGB BLD-MCNC: 12.5 G/DL (ref 11.8–16)
IMM GRANULOCYTES # BLD AUTO: 0.02 X10(3)/MCL (ref 0–0.03)
IMM GRANULOCYTES NFR BLD AUTO: 0.3 % (ref 0–0.5)
LYMPHOCYTES # BLD AUTO: 2.72 X10(3)/MCL (ref 1.16–3.74)
LYMPHOCYTES NFR BLD AUTO: 35.2 % (ref 20–55)
MCH RBC QN AUTO: 28.8 PG (ref 27–34)
MCHC RBC AUTO-ENTMCNC: 32.6 G/DL (ref 31–37)
MCV RBC AUTO: 88.2 FL (ref 79–99)
MONOCYTES # BLD AUTO: 0.69 X10(3)/MCL (ref 0.24–0.36)
MONOCYTES NFR BLD AUTO: 8.9 % (ref 4.7–12.5)
NEUTROPHILS # BLD AUTO: 4.16 X10(3)/MCL (ref 1.56–6.13)
NEUTROPHILS NFR BLD AUTO: 53.8 % (ref 37–73)
NRBC BLD AUTO-RTO: 0 %
PLATELET # BLD AUTO: 196 X10(3)/MCL (ref 140–371)
PMV BLD AUTO: 9.7 FL (ref 9.4–12.4)
POTASSIUM SERPL-SCNC: 3.8 MMOL/L (ref 3.5–5.1)
PROT SERPL-MCNC: 6.1 GM/DL (ref 5.8–7.6)
RBC # BLD AUTO: 4.34 X10(6)/MCL (ref 4–5.1)
SODIUM SERPL-SCNC: 143 MMOL/L (ref 136–145)
WBC # BLD AUTO: 7.73 X10(3)/MCL (ref 4–11.5)

## 2025-08-13 PROCEDURE — 36415 COLL VENOUS BLD VENIPUNCTURE: CPT | Performed by: FAMILY MEDICINE

## 2025-08-13 PROCEDURE — 25000003 PHARM REV CODE 250: Performed by: FAMILY MEDICINE

## 2025-08-13 PROCEDURE — G0378 HOSPITAL OBSERVATION PER HR: HCPCS

## 2025-08-13 PROCEDURE — 80053 COMPREHEN METABOLIC PANEL: CPT | Performed by: FAMILY MEDICINE

## 2025-08-13 PROCEDURE — 85025 COMPLETE CBC W/AUTO DIFF WBC: CPT | Performed by: FAMILY MEDICINE

## 2025-08-13 PROCEDURE — 94761 N-INVAS EAR/PLS OXIMETRY MLT: CPT

## 2025-08-13 PROCEDURE — 25000003 PHARM REV CODE 250: Performed by: NURSE PRACTITIONER

## 2025-08-13 RX ORDER — METOPROLOL TARTRATE 25 MG/1
25 TABLET, FILM COATED ORAL 2 TIMES DAILY
Start: 2025-08-13 | End: 2026-08-13

## 2025-08-13 RX ADMIN — METOPROLOL TARTRATE 25 MG: 25 TABLET, FILM COATED ORAL at 08:08

## 2025-08-13 RX ADMIN — ASPIRIN 325 MG: 325 TABLET, COATED ORAL at 08:08

## 2025-08-13 RX ADMIN — CALCIUM CARBONATE (ANTACID) CHEW TAB 500 MG 500 MG: 500 CHEW TAB at 08:08

## 2025-08-13 RX ADMIN — CHOLESTYRAMINE 4 G: 4 POWDER, FOR SUSPENSION ORAL at 08:08

## 2025-08-13 RX ADMIN — LEVOTHYROXINE SODIUM 75 MCG: 0.07 TABLET ORAL at 05:08

## 2025-08-13 RX ADMIN — SUCRALFATE ORAL SUSPENSION 1 G: 1 SUSPENSION ORAL at 05:08
